# Patient Record
Sex: FEMALE | Race: WHITE | NOT HISPANIC OR LATINO | Employment: OTHER | URBAN - NONMETROPOLITAN AREA
[De-identification: names, ages, dates, MRNs, and addresses within clinical notes are randomized per-mention and may not be internally consistent; named-entity substitution may affect disease eponyms.]

---

## 2023-04-24 ENCOUNTER — TELEPHONE (OUTPATIENT)
Dept: PRIMARY CARE | Facility: CLINIC | Age: 68
End: 2023-04-24

## 2023-04-24 NOTE — TELEPHONE ENCOUNTER
Pt called stating that tomorrow she is have 2 MRI's back to back. She is very anxious and is requesting that you a RX for 1 percocet she said you've done this is the past for her. Please advise, Thank you.

## 2023-05-31 ENCOUNTER — HOSPITAL ENCOUNTER (OUTPATIENT)
Dept: HOSPITAL 100 - PAT | Age: 68
Discharge: HOME | End: 2023-05-31
Payer: MEDICARE

## 2023-05-31 DIAGNOSIS — Z01.818: Primary | ICD-10-CM

## 2023-05-31 LAB
ANION GAP: 6 (ref 5–15)
BUN SERPL-MCNC: 20 MG/DL (ref 7–18)
BUN/CREAT RATIO: 21.1 RATIO (ref 10–20)
CALCIUM SERPL-MCNC: 9.5 MG/DL (ref 8.5–10.1)
CARBON DIOXIDE: 28 MMOL/L (ref 21–32)
CHLORIDE: 104 MMOL/L (ref 98–107)
DEPRECATED RDW RBC: 46.9 FL (ref 35.1–43.9)
ERYTHROCYTE [DISTWIDTH] IN BLOOD: 13.4 % (ref 11.6–14.6)
EST GLOM FILT RATE - AFR AMER: 76 ML/MIN (ref 60–?)
GLUCOSE: 96 MG/DL (ref 74–106)
HCT VFR BLD AUTO: 45.6 % (ref 37–47)
HEMOGLOBIN: 14.9 G/DL (ref 12–15)
HGB BLD-MCNC: 14.9 G/DL (ref 12–15)
IMMATURE GRANULOCYTES COUNT: 0.01 X10^3/UL (ref 0–0)
MAGNESIUM: 2.4 MG/DL (ref 1.6–2.6)
MCV RBC: 94.6 FL (ref 81–99)
MEAN CORP HGB CONC: 32.7 G/DL (ref 32–36)
MEAN PLATELET VOL.: 11.8 FL (ref 6.2–12)
NRBC FLAGGED BY ANALYZER: 0 % (ref 0–5)
PLATELET # BLD: 221 K/MM3 (ref 150–450)
PLATELET COUNT: 221 K/MM3 (ref 150–450)
POTASSIUM: 4 MMOL/L (ref 3.5–5.1)
RBC # BLD AUTO: 4.82 M/MM3 (ref 4.2–5.4)
RBC DISTRIBUTION WIDTH CV: 13.4 % (ref 11.6–14.6)
RBC DISTRIBUTION WIDTH SD: 46.9 FL (ref 35.1–43.9)
WBC # BLD AUTO: 6.1 K/MM3 (ref 4.4–11)
WHITE BLOOD COUNT: 6.1 K/MM3 (ref 4.4–11)

## 2023-05-31 PROCEDURE — 36415 COLL VENOUS BLD VENIPUNCTURE: CPT

## 2023-05-31 PROCEDURE — 83735 ASSAY OF MAGNESIUM: CPT

## 2023-05-31 PROCEDURE — 87081 CULTURE SCREEN ONLY: CPT

## 2023-05-31 PROCEDURE — 86803 HEPATITIS C AB TEST: CPT

## 2023-05-31 PROCEDURE — 86703 HIV-1/HIV-2 1 RESULT ANTBDY: CPT

## 2023-05-31 PROCEDURE — 80048 BASIC METABOLIC PNL TOTAL CA: CPT

## 2023-05-31 PROCEDURE — 85025 COMPLETE CBC W/AUTO DIFF WBC: CPT

## 2023-05-31 PROCEDURE — 86706 HEP B SURFACE ANTIBODY: CPT

## 2023-05-31 PROCEDURE — 84443 ASSAY THYROID STIM HORMONE: CPT

## 2023-05-31 PROCEDURE — 86708 HEPATITIS A ANTIBODY: CPT

## 2023-06-07 ENCOUNTER — TELEPHONE (OUTPATIENT)
Dept: PRIMARY CARE | Facility: CLINIC | Age: 68
End: 2023-06-07

## 2023-06-07 NOTE — TELEPHONE ENCOUNTER
Pt LVM stating that she had an order for a stress test but it has . She would like for you to order a new one so she can get that set up. Please advise, Thank you.

## 2023-06-07 NOTE — TELEPHONE ENCOUNTER
Do you know how long ago it was ordered?  I guess I am not understanding why it was not done at the time that I requested it.  Probably need to see her back for reevaluation if its been several months

## 2023-06-08 ENCOUNTER — TELEPHONE (OUTPATIENT)
Dept: PRIMARY CARE | Facility: CLINIC | Age: 68
End: 2023-06-08

## 2023-06-08 NOTE — TELEPHONE ENCOUNTER
Danuta was supposed to have neck surgery last Tuesday by Dr. Ortiz in [place]. They cancelled the surgery because she had an abnormal stress test in 2018.  They want her to have another stress test before rescheduling the surgery. Can you order this for her?

## 2023-06-08 NOTE — TELEPHONE ENCOUNTER
She basically is in need of preoperative assessment and we certainly can order a stress test but we also need to see her for the complete preoperative assessment.  I will definitely need to see her for assessment.  Thank you

## 2023-06-14 ENCOUNTER — APPOINTMENT (OUTPATIENT)
Dept: PRIMARY CARE | Facility: CLINIC | Age: 68
End: 2023-06-14

## 2023-08-05 ENCOUNTER — HOSPITAL ENCOUNTER (OUTPATIENT)
Dept: HOSPITAL 100 - MRI | Age: 68
Discharge: HOME | End: 2023-08-05
Payer: MEDICARE

## 2023-08-05 DIAGNOSIS — M43.12: Primary | ICD-10-CM

## 2023-08-05 PROCEDURE — 72141 MRI NECK SPINE W/O DYE: CPT

## 2023-08-09 ENCOUNTER — HOSPITAL ENCOUNTER (INPATIENT)
Dept: HOSPITAL 100 - ACINP | Age: 68
LOS: 1 days | Discharge: HOME | DRG: 473 | End: 2023-08-10
Payer: MEDICARE

## 2023-08-09 VITALS
OXYGEN SATURATION: 94 % | RESPIRATION RATE: 18 BRPM | DIASTOLIC BLOOD PRESSURE: 82 MMHG | SYSTOLIC BLOOD PRESSURE: 130 MMHG | HEART RATE: 114 BPM | TEMPERATURE: 97.6 F

## 2023-08-09 VITALS
SYSTOLIC BLOOD PRESSURE: 118 MMHG | TEMPERATURE: 98.42 F | DIASTOLIC BLOOD PRESSURE: 80 MMHG | RESPIRATION RATE: 16 BRPM | OXYGEN SATURATION: 93 % | HEART RATE: 102 BPM

## 2023-08-09 VITALS
OXYGEN SATURATION: 94 % | DIASTOLIC BLOOD PRESSURE: 96 MMHG | SYSTOLIC BLOOD PRESSURE: 108 MMHG | HEART RATE: 115 BPM | RESPIRATION RATE: 16 BRPM

## 2023-08-09 VITALS
DIASTOLIC BLOOD PRESSURE: 96 MMHG | RESPIRATION RATE: 16 BRPM | HEART RATE: 111 BPM | SYSTOLIC BLOOD PRESSURE: 131 MMHG | OXYGEN SATURATION: 92 %

## 2023-08-09 VITALS
OXYGEN SATURATION: 93 % | SYSTOLIC BLOOD PRESSURE: 107 MMHG | DIASTOLIC BLOOD PRESSURE: 74 MMHG | RESPIRATION RATE: 18 BRPM | HEART RATE: 112 BPM | TEMPERATURE: 98.42 F

## 2023-08-09 VITALS
SYSTOLIC BLOOD PRESSURE: 141 MMHG | RESPIRATION RATE: 16 BRPM | DIASTOLIC BLOOD PRESSURE: 96 MMHG | OXYGEN SATURATION: 91 % | TEMPERATURE: 98.8 F | HEART RATE: 108 BPM

## 2023-08-09 VITALS
RESPIRATION RATE: 18 BRPM | OXYGEN SATURATION: 97 % | SYSTOLIC BLOOD PRESSURE: 131 MMHG | DIASTOLIC BLOOD PRESSURE: 96 MMHG | TEMPERATURE: 98.3 F | HEART RATE: 91 BPM

## 2023-08-09 VITALS
SYSTOLIC BLOOD PRESSURE: 131 MMHG | HEART RATE: 113 BPM | DIASTOLIC BLOOD PRESSURE: 96 MMHG | OXYGEN SATURATION: 92 % | RESPIRATION RATE: 16 BRPM

## 2023-08-09 VITALS
SYSTOLIC BLOOD PRESSURE: 136 MMHG | DIASTOLIC BLOOD PRESSURE: 96 MMHG | OXYGEN SATURATION: 99 % | RESPIRATION RATE: 15 BRPM | HEART RATE: 105 BPM

## 2023-08-09 VITALS — OXYGEN SATURATION: 93 %

## 2023-08-09 VITALS
RESPIRATION RATE: 15 BRPM | DIASTOLIC BLOOD PRESSURE: 90 MMHG | HEART RATE: 103 BPM | OXYGEN SATURATION: 94 % | SYSTOLIC BLOOD PRESSURE: 131 MMHG

## 2023-08-09 VITALS — HEART RATE: 102 BPM

## 2023-08-09 VITALS
RESPIRATION RATE: 16 BRPM | DIASTOLIC BLOOD PRESSURE: 96 MMHG | OXYGEN SATURATION: 95 % | SYSTOLIC BLOOD PRESSURE: 131 MMHG | TEMPERATURE: 97.34 F | HEART RATE: 113 BPM

## 2023-08-09 VITALS
RESPIRATION RATE: 16 BRPM | OXYGEN SATURATION: 92 % | SYSTOLIC BLOOD PRESSURE: 131 MMHG | DIASTOLIC BLOOD PRESSURE: 99 MMHG | HEART RATE: 112 BPM

## 2023-08-09 VITALS
SYSTOLIC BLOOD PRESSURE: 108 MMHG | HEART RATE: 118 BPM | OXYGEN SATURATION: 91 % | DIASTOLIC BLOOD PRESSURE: 83 MMHG | RESPIRATION RATE: 16 BRPM

## 2023-08-09 VITALS
SYSTOLIC BLOOD PRESSURE: 115 MMHG | HEART RATE: 90 BPM | TEMPERATURE: 98 F | RESPIRATION RATE: 16 BRPM | OXYGEN SATURATION: 92 % | DIASTOLIC BLOOD PRESSURE: 68 MMHG

## 2023-08-09 VITALS
SYSTOLIC BLOOD PRESSURE: 114 MMHG | HEART RATE: 108 BPM | DIASTOLIC BLOOD PRESSURE: 86 MMHG | RESPIRATION RATE: 14 BRPM | OXYGEN SATURATION: 95 %

## 2023-08-09 VITALS
RESPIRATION RATE: 16 BRPM | SYSTOLIC BLOOD PRESSURE: 131 MMHG | OXYGEN SATURATION: 99 % | DIASTOLIC BLOOD PRESSURE: 80 MMHG | HEART RATE: 107 BPM

## 2023-08-09 VITALS
OXYGEN SATURATION: 94 % | SYSTOLIC BLOOD PRESSURE: 131 MMHG | RESPIRATION RATE: 14 BRPM | HEART RATE: 115 BPM | DIASTOLIC BLOOD PRESSURE: 96 MMHG

## 2023-08-09 VITALS
DIASTOLIC BLOOD PRESSURE: 83 MMHG | RESPIRATION RATE: 14 BRPM | HEART RATE: 108 BPM | OXYGEN SATURATION: 93 % | SYSTOLIC BLOOD PRESSURE: 115 MMHG

## 2023-08-09 VITALS
HEART RATE: 116 BPM | OXYGEN SATURATION: 92 % | SYSTOLIC BLOOD PRESSURE: 131 MMHG | DIASTOLIC BLOOD PRESSURE: 96 MMHG | RESPIRATION RATE: 14 BRPM

## 2023-08-09 VITALS
TEMPERATURE: 98.1 F | OXYGEN SATURATION: 92 % | RESPIRATION RATE: 16 BRPM | SYSTOLIC BLOOD PRESSURE: 100 MMHG | DIASTOLIC BLOOD PRESSURE: 65 MMHG | HEART RATE: 102 BPM

## 2023-08-09 VITALS
RESPIRATION RATE: 14 BRPM | OXYGEN SATURATION: 93 % | DIASTOLIC BLOOD PRESSURE: 96 MMHG | SYSTOLIC BLOOD PRESSURE: 122 MMHG | HEART RATE: 110 BPM

## 2023-08-09 VITALS — SYSTOLIC BLOOD PRESSURE: 131 MMHG | DIASTOLIC BLOOD PRESSURE: 96 MMHG

## 2023-08-09 VITALS — BODY MASS INDEX: 27.3 KG/M2

## 2023-08-09 DIAGNOSIS — E78.00: ICD-10-CM

## 2023-08-09 DIAGNOSIS — Z79.82: ICD-10-CM

## 2023-08-09 DIAGNOSIS — M43.12: ICD-10-CM

## 2023-08-09 DIAGNOSIS — Z79.899: ICD-10-CM

## 2023-08-09 DIAGNOSIS — F41.9: ICD-10-CM

## 2023-08-09 DIAGNOSIS — F32.A: ICD-10-CM

## 2023-08-09 DIAGNOSIS — Z79.890: ICD-10-CM

## 2023-08-09 DIAGNOSIS — M50.123: Primary | ICD-10-CM

## 2023-08-09 DIAGNOSIS — I10: ICD-10-CM

## 2023-08-09 DIAGNOSIS — G43.709: ICD-10-CM

## 2023-08-09 DIAGNOSIS — G47.33: ICD-10-CM

## 2023-08-09 DIAGNOSIS — R09.02: ICD-10-CM

## 2023-08-09 DIAGNOSIS — K21.9: ICD-10-CM

## 2023-08-09 DIAGNOSIS — Z87.891: ICD-10-CM

## 2023-08-09 DIAGNOSIS — M48.02: ICD-10-CM

## 2023-08-09 DIAGNOSIS — E03.9: ICD-10-CM

## 2023-08-09 LAB — TROPONIN-I HS: 6 PG/ML (ref 3–54)

## 2023-08-09 PROCEDURE — 99252 IP/OBS CONSLTJ NEW/EST SF 35: CPT

## 2023-08-09 PROCEDURE — 93005 ELECTROCARDIOGRAM TRACING: CPT

## 2023-08-09 PROCEDURE — 82962 GLUCOSE BLOOD TEST: CPT

## 2023-08-09 PROCEDURE — 71045 X-RAY EXAM CHEST 1 VIEW: CPT

## 2023-08-09 PROCEDURE — 84484 ASSAY OF TROPONIN QUANT: CPT

## 2023-08-09 PROCEDURE — A4216 STERILE WATER/SALINE, 10 ML: HCPCS

## 2023-08-09 PROCEDURE — 97162 PT EVAL MOD COMPLEX 30 MIN: CPT

## 2023-08-09 PROCEDURE — C1713 ANCHOR/SCREW BN/BN,TIS/BN: HCPCS

## 2023-08-09 PROCEDURE — 94762 N-INVAS EAR/PLS OXIMTRY CONT: CPT

## 2023-08-09 PROCEDURE — 88304 TISSUE EXAM BY PATHOLOGIST: CPT

## 2023-08-09 PROCEDURE — 72020 X-RAY EXAM OF SPINE 1 VIEW: CPT

## 2023-08-09 PROCEDURE — G0463 HOSPITAL OUTPT CLINIC VISIT: HCPCS

## 2023-08-09 PROCEDURE — 94668 MNPJ CHEST WALL SBSQ: CPT

## 2023-08-09 PROCEDURE — 0RG20A0 FUSION OF 2 OR MORE CERVICAL VERTEBRAL JOINTS WITH INTERBODY FUSION DEVICE, ANTERIOR APPROACH, ANTERIOR COLUMN, OPEN APPROACH: ICD-10-PCS | Performed by: ORTHOPAEDIC SURGERY

## 2023-08-09 PROCEDURE — 94640 AIRWAY INHALATION TREATMENT: CPT

## 2023-08-09 RX ADMIN — CEFAZOLIN SODIUM 100 GM: 1 INJECTION, SOLUTION INTRAVENOUS at 15:47

## 2023-08-09 RX ADMIN — SODIUM CHLORIDE, PRESERVATIVE FREE 0 ML: 5 INJECTION INTRAVENOUS at 18:04

## 2023-08-09 RX ADMIN — CEFAZOLIN 150 GM: 10 INJECTION, POWDER, FOR SOLUTION INTRAVENOUS at 08:10

## 2023-08-09 RX ADMIN — MAGNESIUM SULFATE HEPTAHYDRATE 408 GM: 500 INJECTION, SOLUTION INTRAMUSCULAR; INTRAVENOUS at 06:10

## 2023-08-09 RX ADMIN — THROMBIN TOPICAL RECOMBINANT 20000 UNIT: KIT at 08:35

## 2023-08-09 RX ADMIN — CEFAZOLIN SODIUM 100 GM: 1 INJECTION, SOLUTION INTRAVENOUS at 23:46

## 2023-08-10 VITALS
RESPIRATION RATE: 18 BRPM | OXYGEN SATURATION: 92 % | TEMPERATURE: 97.52 F | HEART RATE: 89 BPM | DIASTOLIC BLOOD PRESSURE: 61 MMHG | SYSTOLIC BLOOD PRESSURE: 108 MMHG

## 2023-08-10 VITALS
HEART RATE: 78 BPM | SYSTOLIC BLOOD PRESSURE: 112 MMHG | OXYGEN SATURATION: 93 % | RESPIRATION RATE: 16 BRPM | TEMPERATURE: 97.3 F | DIASTOLIC BLOOD PRESSURE: 75 MMHG

## 2023-08-10 VITALS — OXYGEN SATURATION: 96 %

## 2023-08-10 VITALS
RESPIRATION RATE: 18 BRPM | DIASTOLIC BLOOD PRESSURE: 64 MMHG | TEMPERATURE: 97.5 F | SYSTOLIC BLOOD PRESSURE: 103 MMHG | OXYGEN SATURATION: 92 % | HEART RATE: 98 BPM

## 2023-08-10 VITALS
TEMPERATURE: 97.88 F | RESPIRATION RATE: 18 BRPM | SYSTOLIC BLOOD PRESSURE: 117 MMHG | HEART RATE: 93 BPM | DIASTOLIC BLOOD PRESSURE: 76 MMHG | OXYGEN SATURATION: 92 %

## 2023-08-10 VITALS — HEART RATE: 89 BPM | RESPIRATION RATE: 18 BRPM

## 2023-08-10 VITALS — OXYGEN SATURATION: 93 %

## 2023-08-10 VITALS — OXYGEN SATURATION: 95 %

## 2023-08-10 RX ADMIN — SODIUM CHLORIDE, PRESERVATIVE FREE 0 ML: 5 INJECTION INTRAVENOUS at 13:45

## 2023-08-10 RX ADMIN — Medication 1 LOZENGE: at 13:49

## 2023-08-29 ENCOUNTER — HOSPITAL ENCOUNTER (OUTPATIENT)
Dept: HOSPITAL 100 - OPBD | Age: 68
Discharge: HOME | End: 2023-08-29
Payer: MEDICARE

## 2023-08-29 DIAGNOSIS — M81.0: Primary | ICD-10-CM

## 2023-08-29 PROCEDURE — 77080 DXA BONE DENSITY AXIAL: CPT

## 2023-10-04 ENCOUNTER — OFFICE VISIT (OUTPATIENT)
Dept: PRIMARY CARE | Facility: CLINIC | Age: 68
End: 2023-10-04
Payer: MEDICARE

## 2023-10-04 VITALS
SYSTOLIC BLOOD PRESSURE: 120 MMHG | HEART RATE: 97 BPM | BODY MASS INDEX: 25.01 KG/M2 | OXYGEN SATURATION: 98 % | HEIGHT: 66 IN | DIASTOLIC BLOOD PRESSURE: 82 MMHG | WEIGHT: 155.6 LBS

## 2023-10-04 DIAGNOSIS — E78.2 MIXED HYPERLIPIDEMIA: ICD-10-CM

## 2023-10-04 DIAGNOSIS — R09.82 PND (POST-NASAL DRIP): ICD-10-CM

## 2023-10-04 DIAGNOSIS — Z12.31 ENCOUNTER FOR SCREENING MAMMOGRAM FOR MALIGNANT NEOPLASM OF BREAST: ICD-10-CM

## 2023-10-04 DIAGNOSIS — F41.8 ANXIETY WITH DEPRESSION: ICD-10-CM

## 2023-10-04 DIAGNOSIS — I15.9 SECONDARY HYPERTENSION: ICD-10-CM

## 2023-10-04 DIAGNOSIS — G47.00 INSOMNIA, UNSPECIFIED TYPE: ICD-10-CM

## 2023-10-04 DIAGNOSIS — Z00.00 MEDICARE ANNUAL WELLNESS VISIT, SUBSEQUENT: Primary | ICD-10-CM

## 2023-10-04 PROBLEM — L71.9 ROSACEA: Status: ACTIVE | Noted: 2023-10-04

## 2023-10-04 PROBLEM — K76.0 FATTY LIVER: Status: ACTIVE | Noted: 2023-10-04

## 2023-10-04 PROBLEM — J45.20 MILD INTERMITTENT ASTHMA WITHOUT COMPLICATION (HHS-HCC): Status: ACTIVE | Noted: 2023-10-04

## 2023-10-04 PROBLEM — M81.0 OSTEOPOROSIS: Status: ACTIVE | Noted: 2023-09-26

## 2023-10-04 PROBLEM — T78.40XA ALLERGIES: Status: ACTIVE | Noted: 2023-10-04

## 2023-10-04 PROBLEM — E55.9 VITAMIN D DEFICIENCY: Status: ACTIVE | Noted: 2023-10-04

## 2023-10-04 PROBLEM — G43.909 MIGRAINE HEADACHE: Status: ACTIVE | Noted: 2023-10-04

## 2023-10-04 PROBLEM — E03.8 SECONDARY HYPOTHYROIDISM: Status: ACTIVE | Noted: 2023-10-04

## 2023-10-04 PROBLEM — R69 TAKING MEDICATION FOR CHRONIC DISEASE: Status: ACTIVE | Noted: 2023-10-04

## 2023-10-04 PROBLEM — G47.30 SLEEP APNEA: Status: ACTIVE | Noted: 2019-05-20

## 2023-10-04 PROBLEM — E78.00 ELEVATED LDL CHOLESTEROL LEVEL: Status: ACTIVE | Noted: 2023-10-04

## 2023-10-04 PROCEDURE — 90662 IIV NO PRSV INCREASED AG IM: CPT | Performed by: INTERNAL MEDICINE

## 2023-10-04 PROCEDURE — 99214 OFFICE O/P EST MOD 30 MIN: CPT | Performed by: INTERNAL MEDICINE

## 2023-10-04 PROCEDURE — G0008 ADMIN INFLUENZA VIRUS VAC: HCPCS | Performed by: INTERNAL MEDICINE

## 2023-10-04 PROCEDURE — 3079F DIAST BP 80-89 MM HG: CPT | Performed by: INTERNAL MEDICINE

## 2023-10-04 PROCEDURE — 1159F MED LIST DOCD IN RCRD: CPT | Performed by: INTERNAL MEDICINE

## 2023-10-04 PROCEDURE — 1036F TOBACCO NON-USER: CPT | Performed by: INTERNAL MEDICINE

## 2023-10-04 PROCEDURE — 90677 PCV20 VACCINE IM: CPT | Performed by: INTERNAL MEDICINE

## 2023-10-04 PROCEDURE — 1160F RVW MEDS BY RX/DR IN RCRD: CPT | Performed by: INTERNAL MEDICINE

## 2023-10-04 PROCEDURE — 3074F SYST BP LT 130 MM HG: CPT | Performed by: INTERNAL MEDICINE

## 2023-10-04 PROCEDURE — G0439 PPPS, SUBSEQ VISIT: HCPCS | Performed by: INTERNAL MEDICINE

## 2023-10-04 PROCEDURE — 1170F FXNL STATUS ASSESSED: CPT | Performed by: INTERNAL MEDICINE

## 2023-10-04 PROCEDURE — G0009 ADMIN PNEUMOCOCCAL VACCINE: HCPCS | Performed by: INTERNAL MEDICINE

## 2023-10-04 RX ORDER — TRAZODONE HYDROCHLORIDE 50 MG/1
50 TABLET ORAL NIGHTLY
Qty: 90 TABLET | Refills: 1 | Status: SHIPPED | OUTPATIENT
Start: 2023-10-04 | End: 2024-01-04 | Stop reason: WASHOUT

## 2023-10-04 RX ORDER — TRAZODONE HYDROCHLORIDE 50 MG/1
50 TABLET ORAL NIGHTLY
Qty: 10 TABLET | Refills: 0 | Status: SHIPPED | OUTPATIENT
Start: 2023-10-04 | End: 2024-01-04

## 2023-10-04 RX ORDER — VENLAFAXINE HYDROCHLORIDE 150 MG/1
150 CAPSULE, EXTENDED RELEASE ORAL DAILY
Qty: 90 CAPSULE | Refills: 1 | Status: SHIPPED | OUTPATIENT
Start: 2023-10-04 | End: 2024-02-12

## 2023-10-04 RX ORDER — LISINOPRIL AND HYDROCHLOROTHIAZIDE 10; 12.5 MG/1; MG/1
1 TABLET ORAL DAILY
COMMUNITY
Start: 2020-04-01 | End: 2023-10-04 | Stop reason: SDUPTHER

## 2023-10-04 RX ORDER — VENLAFAXINE HYDROCHLORIDE 150 MG/1
150 CAPSULE, EXTENDED RELEASE ORAL DAILY
COMMUNITY
Start: 2021-05-10 | End: 2023-10-04 | Stop reason: SDUPTHER

## 2023-10-04 RX ORDER — ASPIRIN 81 MG/1
81 TABLET ORAL DAILY
COMMUNITY

## 2023-10-04 RX ORDER — ATORVASTATIN CALCIUM 40 MG/1
40 TABLET, FILM COATED ORAL DAILY
COMMUNITY
Start: 2018-05-09 | End: 2023-12-27

## 2023-10-04 RX ORDER — TRAZODONE HYDROCHLORIDE 50 MG/1
50 TABLET ORAL NIGHTLY
COMMUNITY
End: 2023-10-04 | Stop reason: SDUPTHER

## 2023-10-04 RX ORDER — METOPROLOL SUCCINATE 25 MG/1
25 TABLET, EXTENDED RELEASE ORAL DAILY
COMMUNITY
Start: 2020-01-29 | End: 2023-10-04 | Stop reason: SDUPTHER

## 2023-10-04 RX ORDER — METOPROLOL SUCCINATE 25 MG/1
25 TABLET, EXTENDED RELEASE ORAL DAILY
Qty: 90 TABLET | Refills: 1 | Status: SHIPPED | OUTPATIENT
Start: 2023-10-04 | End: 2024-02-12

## 2023-10-04 RX ORDER — LEVOTHYROXINE SODIUM 88 UG/1
88 TABLET ORAL
COMMUNITY
Start: 2023-07-20 | End: 2024-04-03 | Stop reason: SDUPTHER

## 2023-10-04 RX ORDER — LISINOPRIL AND HYDROCHLOROTHIAZIDE 10; 12.5 MG/1; MG/1
1 TABLET ORAL DAILY
Qty: 90 TABLET | Refills: 1 | Status: SHIPPED | OUTPATIENT
Start: 2023-10-04 | End: 2024-02-12

## 2023-10-04 RX ORDER — FLUTICASONE PROPIONATE 50 MCG
1 SPRAY, SUSPENSION (ML) NASAL DAILY
Qty: 16 G | Refills: 5 | Status: SHIPPED | OUTPATIENT
Start: 2023-10-04 | End: 2024-10-03

## 2023-10-04 ASSESSMENT — PATIENT HEALTH QUESTIONNAIRE - PHQ9
SUM OF ALL RESPONSES TO PHQ9 QUESTIONS 1 AND 2: 0
1. LITTLE INTEREST OR PLEASURE IN DOING THINGS: NOT AT ALL
2. FEELING DOWN, DEPRESSED OR HOPELESS: NOT AT ALL
1. LITTLE INTEREST OR PLEASURE IN DOING THINGS: NOT AT ALL
SUM OF ALL RESPONSES TO PHQ9 QUESTIONS 1 AND 2: 0
2. FEELING DOWN, DEPRESSED OR HOPELESS: NOT AT ALL

## 2023-10-04 ASSESSMENT — ACTIVITIES OF DAILY LIVING (ADL)
BATHING: INDEPENDENT
DOING_HOUSEWORK: INDEPENDENT
MANAGING_FINANCES: INDEPENDENT
TAKING_MEDICATION: INDEPENDENT
DRESSING: INDEPENDENT
GROCERY_SHOPPING: INDEPENDENT

## 2023-10-04 NOTE — PATIENT INSTRUCTIONS
As we discussed I would like for you to start using Flonase nasal spray about an hour prior to bedtime to see if this will help with your postnasal drip and congestion.  I sent a prescription to your pharmacy in case the Flonase you have now is not good.  Please let us know how you are doing  Please try to remember to bring a copy of your advance directives which includes living will and power of  for healthcare the next time you come in  The staff will be helping you to schedule your screening mammogram  We are giving you a pneumonia vaccine today called Prevnar 20 which is recommended and would be a one-time vaccine  Please remember to put grab bars in your bathroom and try to get 30 minutes of aerobic activity 5 days a week

## 2023-10-04 NOTE — PROGRESS NOTES
Subjective   Reason for Visit: Caron Marc is an 67 y.o. female here for a Medicare Wellness visit.     Past Medical, Surgical, and Family History reviewed and updated in chart.    Reviewed all medications by prescribing practitioner or clinical pharmacist (such as prescriptions, OTCs, herbal therapies and supplements) and documented in the medical record.    HPI  She is here today for a routine 6-month checkup and we also completed her annual Medicare wellness visit.  She is looking great and for the most part reports feeling well.  She states since discovered that the source of her headaches was actually neck issues and she had neck surgery on August 9.  She states her headaches have more or less disappeared and she is on less medication at this point.  She also has been diagnosed with osteoporosis and received IV Nica last as part of her treatment.  She is following with Dr. Cancino in [place] for her thyroid condition and hyperparathyroidism.  She had recent laboratory testing in September and I have reviewed that today.  We did conduct a full review of systems and went through the Medicare questionnaire.  She has had no falls in the last year.  We discussed fall prevention and I have specifically recommended she put grab bars in her bathroom.  We talked about avoiding unnecessary rugs.  Her memory appears to be good.  Her hearing also appears to be good.  She reports that her diet is well-balanced and although she is active she does not really exercise on a regular basis.  I do remind her to try to get 30 minutes of aerobic activity 5 days a week.  We also discussed providing a copy of her advance directives for her chart here.  Her blood pressure was excellent today.  She is due for screening mammogram and we will get that ordered.  We are also going to give her the pneumonia vaccine Prevnar 20 today.  If everything goes according to plan and we will see her back in 6 months for reevaluation  Patient Care  "Team:  Eleanor Downs DO as PCP - General  Eleanor Downs DO as PCP - [employer] PCP         Objective   Vitals:  /82   Pulse 97   Ht 1.676 m (5' 5.98\")   Wt 70.6 kg (155 lb 9.6 oz)   SpO2 98%   BMI 25.13 kg/m²       Physical Exam  Vitals and nursing note reviewed.   Constitutional:       General: She is not in acute distress.     Appearance: Normal appearance.   HENT:      Head: Normocephalic and atraumatic.   Eyes:      Conjunctiva/sclera: Conjunctivae normal.   Cardiovascular:      Rate and Rhythm: Normal rate and regular rhythm.      Heart sounds: Normal heart sounds.   Pulmonary:      Effort: No respiratory distress.      Breath sounds: No wheezing.   Abdominal:      Palpations: Abdomen is soft.      Tenderness: There is no abdominal tenderness. There is no guarding.   Musculoskeletal:         General: No swelling. Normal range of motion.   Skin:     General: Skin is warm and dry.   Neurological:      General: No focal deficit present.      Mental Status: She is alert and oriented to person, place, and time.   Psychiatric:         Behavior: Behavior normal.       Assessment/Plan   Problem List Items Addressed This Visit       Encounter for screening mammogram for malignant neoplasm of breast - Primary    PND (post-nasal drip)     -She does have some drainage when lying flat at night and I believe she is suffering from postnasal drip  -She will try Flonase nasal spray 2 sprays each nostril daily and she will let us know if her symptoms do not resolve         Relevant Medications    fluticasone (Flonase) 50 mcg/actuation nasal spray    Anxiety with depression     -Doing well at this time and will continue with Effexor Exar 150 mg daily         Relevant Medications    venlafaxine XR (Effexor-XR) 150 mg 24 hr capsule    Hypertension     -Currently well controlled  -She will remain on metoprolol succinate XL 25 mg daily  -Lisinopril hydrochlorothiazide 10-12.51 tablet daily         " Relevant Medications    lisinopriL-hydrochlorothiazide 10-12.5 mg tablet    metoprolol succinate XL (Toprol-XL) 25 mg 24 hr tablet    Mixed hyperlipidemia     -She will get a fasting lipid profile done prior to her next follow-up visit  -She will continue with atorvastatin 40 mg daily         Relevant Orders    Lipid Panel    Insomnia    Relevant Medications    traZODone (Desyrel) 50 mg tablet    traZODone (Desyrel) 50 mg tablet

## 2023-10-04 NOTE — ASSESSMENT & PLAN NOTE
-Currently well controlled  -She will remain on metoprolol succinate XL 25 mg daily  -Lisinopril hydrochlorothiazide 10-12.51 tablet daily

## 2023-10-04 NOTE — ASSESSMENT & PLAN NOTE
-She will get a fasting lipid profile done prior to her next follow-up visit  -She will continue with atorvastatin 40 mg daily

## 2023-10-04 NOTE — ASSESSMENT & PLAN NOTE
-She does have some drainage when lying flat at night and I believe she is suffering from postnasal drip  -She will try Flonase nasal spray 2 sprays each nostril daily and she will let us know if her symptoms do not resolve

## 2023-12-23 DIAGNOSIS — E78.00 ELEVATED LDL CHOLESTEROL LEVEL: ICD-10-CM

## 2023-12-27 RX ORDER — ATORVASTATIN CALCIUM 40 MG/1
40 TABLET, FILM COATED ORAL DAILY
Qty: 90 TABLET | Refills: 1 | Status: SHIPPED | OUTPATIENT
Start: 2023-12-27 | End: 2024-04-03 | Stop reason: SDUPTHER

## 2024-01-04 ENCOUNTER — OFFICE VISIT (OUTPATIENT)
Dept: PRIMARY CARE | Facility: CLINIC | Age: 69
End: 2024-01-04
Payer: MEDICARE

## 2024-01-04 VITALS
HEART RATE: 98 BPM | BODY MASS INDEX: 25.53 KG/M2 | WEIGHT: 158.1 LBS | OXYGEN SATURATION: 94 % | DIASTOLIC BLOOD PRESSURE: 80 MMHG | SYSTOLIC BLOOD PRESSURE: 118 MMHG

## 2024-01-04 DIAGNOSIS — G47.09 OTHER INSOMNIA: ICD-10-CM

## 2024-01-04 DIAGNOSIS — Z12.31 ENCOUNTER FOR SCREENING MAMMOGRAM FOR MALIGNANT NEOPLASM OF BREAST: ICD-10-CM

## 2024-01-04 DIAGNOSIS — R09.82 PND (POST-NASAL DRIP): ICD-10-CM

## 2024-01-04 DIAGNOSIS — H61.91 DISORDER OF RIGHT EAR LOBE: Primary | ICD-10-CM

## 2024-01-04 PROCEDURE — 99214 OFFICE O/P EST MOD 30 MIN: CPT | Performed by: INTERNAL MEDICINE

## 2024-01-04 PROCEDURE — 3074F SYST BP LT 130 MM HG: CPT | Performed by: INTERNAL MEDICINE

## 2024-01-04 PROCEDURE — 1036F TOBACCO NON-USER: CPT | Performed by: INTERNAL MEDICINE

## 2024-01-04 PROCEDURE — 1159F MED LIST DOCD IN RCRD: CPT | Performed by: INTERNAL MEDICINE

## 2024-01-04 PROCEDURE — 3079F DIAST BP 80-89 MM HG: CPT | Performed by: INTERNAL MEDICINE

## 2024-01-04 PROCEDURE — 1160F RVW MEDS BY RX/DR IN RCRD: CPT | Performed by: INTERNAL MEDICINE

## 2024-01-04 RX ORDER — TRAZODONE HYDROCHLORIDE 100 MG/1
100 TABLET ORAL NIGHTLY PRN
Qty: 30 TABLET | Refills: 5 | Status: SHIPPED | OUTPATIENT
Start: 2024-01-04 | End: 2025-01-03

## 2024-01-04 ASSESSMENT — ENCOUNTER SYMPTOMS
VOMITING: 0
FEVER: 0
COUGH: 0
BACK PAIN: 0
DIARRHEA: 0
FATIGUE: 0
SHORTNESS OF BREATH: 0
PALPITATIONS: 0
SINUS PAIN: 1
SINUS COMPLAINT: 1
ARTHRALGIAS: 0
SINUS PRESSURE: 1
SORE THROAT: 1
NAUSEA: 0
RHINORRHEA: 0
WHEEZING: 0

## 2024-01-04 ASSESSMENT — PATIENT HEALTH QUESTIONNAIRE - PHQ9
1. LITTLE INTEREST OR PLEASURE IN DOING THINGS: NOT AT ALL
SUM OF ALL RESPONSES TO PHQ9 QUESTIONS 1 AND 2: 0
2. FEELING DOWN, DEPRESSED OR HOPELESS: NOT AT ALL

## 2024-01-04 NOTE — PROGRESS NOTES
Subjective   Patient ID: Caron Marc is a 68 y.o. female who presents for Ear Problem (Knot on earlobe-itches,burns. X 2 months) and Sinus Problem.  Sinus Problem  Associated symptoms include congestion, ear pain, sinus pressure and a sore throat. Pertinent negatives include no coughing or shortness of breath.   She is here today for evaluation of several concerns.  She has had prior ear piercings and on the right ear she started developing irritation at the piercing site so she stopped wearing her earrings.  Despite not using earrings she continues to have irritation to that right earlobe and she has a thickening at the site where the hole was.  It is tender to touch.  It has been present for several months.  She also struggles with postnasal drip despite using her Flonase nasal spray.  After further discussion I decided to refer her to ENT so that we can address both problems.  She also states she still struggles with insomnia and her trazodone at 50 mg was ineffective.  Again we talked about sleep hygiene and how sometimes altering sleep practices can make a huge difference and better sleep.  We also discussed how we try to get away from the hypnotics because it can have side effects over time.  I have agreed to give her a higher dose of trazodone but I am also giving her handout that goes over all the tips to help improve her sleep hygiene and I asked that she read the handout several times and follow all the directions.  Otherwise we will see her back as previously planned.  Review of Systems   Constitutional:  Negative for fatigue and fever.   HENT:  Positive for congestion, ear pain, postnasal drip, sinus pressure, sinus pain and sore throat. Negative for rhinorrhea.    Respiratory:  Negative for cough, shortness of breath and wheezing.    Cardiovascular:  Negative for chest pain, palpitations and leg swelling.   Gastrointestinal:  Negative for diarrhea, nausea and vomiting.   Musculoskeletal:  Negative  for arthralgias and back pain.     Objective   Physical Exam  Vitals and nursing note reviewed.   Constitutional:       General: She is not in acute distress.     Appearance: Normal appearance.   HENT:      Head: Normocephalic and atraumatic.   Eyes:      Conjunctiva/sclera: Conjunctivae normal.   Cardiovascular:      Rate and Rhythm: Normal rate and regular rhythm.      Heart sounds: Normal heart sounds.   Pulmonary:      Effort: No respiratory distress.      Breath sounds: No wheezing.   Abdominal:      Palpations: Abdomen is soft.      Tenderness: There is no abdominal tenderness. There is no guarding.   Musculoskeletal:         General: No swelling. Normal range of motion.   Skin:     General: Skin is warm and dry.   Neurological:      General: No focal deficit present.      Mental Status: She is alert and oriented to person, place, and time.   Psychiatric:         Behavior: Behavior normal.       Assessment/Plan   Problem List Items Addressed This Visit             ICD-10-CM    Encounter for screening mammogram for malignant neoplasm of breast Z12.31     -She has agreed to get her mammogram scheduled at her earliest convenience         Relevant Orders    BI mammo bilateral screening tomosynthesis    PND (post-nasal drip) R09.82     -She will continue with her Flonase nasal spray but also see Dr. 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 Richard for further evaluation and treatment         Relevant Orders    Referral to ENT          [facility]

## 2024-01-04 NOTE — ASSESSMENT & PLAN NOTE
-I have increased the dose of her trazodone to 100 mg at bedtime  -We will have her work on the sleep hygiene recommendations and hopefully she can get away from using the hypnotic regularly

## 2024-01-04 NOTE — ASSESSMENT & PLAN NOTE
-She will continue with her Flonase nasal spray but also see Dr. Richard for consideration of allergy testing

## 2024-01-04 NOTE — PATIENT INSTRUCTIONS
The staff will be helping you to get an appointment with Dr. Richard to evaluate your right earlobe and also to hopefully help with your allergy symptoms  Please remember to get your mammogram scheduled at your earliest convenience as we want to be caught up for breast cancer screening  Please read the handout I gave you today on insomnia several times as there are great tips in helping to improve your sleep habits and hopefully allow you to get away from the prescription medication  We will see you back as previously planned

## 2024-01-11 ENCOUNTER — ANCILLARY PROCEDURE (OUTPATIENT)
Dept: RADIOLOGY | Facility: CLINIC | Age: 69
End: 2024-01-11
Payer: MEDICARE

## 2024-01-11 DIAGNOSIS — Z12.31 ENCOUNTER FOR SCREENING MAMMOGRAM FOR MALIGNANT NEOPLASM OF BREAST: ICD-10-CM

## 2024-01-11 PROCEDURE — 77067 SCR MAMMO BI INCL CAD: CPT

## 2024-01-11 PROCEDURE — 77063 BREAST TOMOSYNTHESIS BI: CPT | Performed by: RADIOLOGY

## 2024-01-11 PROCEDURE — 77067 SCR MAMMO BI INCL CAD: CPT | Performed by: RADIOLOGY

## 2024-01-22 ENCOUNTER — OFFICE VISIT (OUTPATIENT)
Dept: URGENT CARE | Facility: CLINIC | Age: 69
End: 2024-01-22
Payer: MEDICARE

## 2024-01-22 VITALS
HEART RATE: 87 BPM | OXYGEN SATURATION: 96 % | DIASTOLIC BLOOD PRESSURE: 90 MMHG | TEMPERATURE: 97.1 F | HEIGHT: 63 IN | RESPIRATION RATE: 18 BRPM | BODY MASS INDEX: 26.58 KG/M2 | WEIGHT: 150 LBS | SYSTOLIC BLOOD PRESSURE: 128 MMHG

## 2024-01-22 DIAGNOSIS — J02.9 SORE THROAT: ICD-10-CM

## 2024-01-22 DIAGNOSIS — J01.00 ACUTE NON-RECURRENT MAXILLARY SINUSITIS: Primary | ICD-10-CM

## 2024-01-22 LAB — POC GROUP A STREP, PCR: NOT DETECTED

## 2024-01-22 PROCEDURE — 99213 OFFICE O/P EST LOW 20 MIN: CPT | Mod: 25 | Performed by: NURSE PRACTITIONER

## 2024-01-22 RX ORDER — AMOXICILLIN AND CLAVULANATE POTASSIUM 875; 125 MG/1; MG/1
1 TABLET, FILM COATED ORAL 2 TIMES DAILY
Qty: 14 TABLET | Refills: 0 | Status: SHIPPED | OUTPATIENT
Start: 2024-01-22 | End: 2024-01-29

## 2024-01-22 NOTE — PROGRESS NOTES
[facility] NOTE:      Name: Caron Marc, 68 y.o.    CSN:[id number]   MRN:[id number]    PCP: Eleanor Downs, DO    ALL:  No Known Allergies    History:    Chief Complaint: Sore Throat (Sore throat, sinus congestion , headaches, chills X 4 days)    Encounter Date: 1/22/2024  11:40    HPI: The history was obtained from the patient. Caron is a 68 y.o. female, who presents with a chief complaint of Sore Throat (Sore throat, sinus congestion , headaches, chills X 4 days) . States that the cough is dry, has also been having chills/sweats. Denies any N/V/D, chest pain, fever. Has tried hydrogen peroxide gargles and has had no relief of symptoms.     PMHx:    Past Medical History:   Diagnosis Date    Obstructive sleep apnea (adult) (pediatric)     Obstructive sleep apnea, adult    Other specified abnormal findings of blood chemistry 08/11/2021    Elevated liver function tests    Personal history of other diseases of the musculoskeletal system and connective tissue     History of osteoporosis    Personal history of other endocrine, nutritional and metabolic disease     History of hypothyroidism    Personal history of other endocrine, nutritional and metabolic disease     History of hyperlipidemia    Personal history of other specified conditions 07/30/2020    History of abdominal pain    Unspecified fracture of sternum, initial encounter for closed fracture 01/28/2021    Fracture of sternum              Current Outpatient Medications   Medication Sig Dispense Refill    aspirin (Aspir-81) 81 mg EC tablet Take 1 tablet (81 mg) by mouth once daily.      atorvastatin (Lipitor) 40 mg tablet TAKE 1 TABLET BY MOUTH DAILY 90 tablet 1    fluticasone (Flonase) 50 mcg/actuation nasal spray Administer 1 spray into each nostril once daily. Shake gently. Before first use, prime pump. After use, clean tip and replace cap. 16 g 5    levothyroxine (Synthroid, Levoxyl) 88 mcg tablet Take 1 tablet (88 mcg) by  mouth once daily.      lisinopriL-hydrochlorothiazide 10-12.5 mg tablet Take 1 tablet by mouth once daily. 90 tablet 1    metoprolol succinate XL (Toprol-XL) 25 mg 24 hr tablet Take 1 tablet (25 mg) by mouth once daily. 90 tablet 1    traZODone (Desyrel) 100 mg tablet Take 1 tablet (100 mg) by mouth as needed at bedtime for sleep. 30 tablet 5    venlafaxine XR (Effexor-XR) 150 mg 24 hr capsule Take 1 capsule (150 mg) by mouth once daily. 90 capsule 1    amoxicillin-pot clavulanate (Augmentin) 875-125 mg tablet Take 1 tablet (875 mg) by mouth 2 times a day for 7 days. 14 tablet 0     No current facility-administered medications for this visit.         PMSx:    Past Surgical History:   Procedure Laterality Date    CERVICAL DISC SURGERY      COLONOSCOPY  2020    Complete Colonoscopy    OTHER SURGICAL HISTORY  2021    Joint replacement procedure    OTHER SURGICAL HISTORY  2020    Wrist surgery       Fam Hx: No family history on file.    SOC. Hx:     Social History     Socioeconomic History    Marital status:      Spouse name: Not on file    Number of children: Not on file    Years of education: Not on file    Highest education level: Not on file   Occupational History    Not on file   Tobacco Use    Smoking status: Former     Types: Cigarettes     Quit date:      Years since quittin.0    Smokeless tobacco: Never   Substance and Sexual Activity    Alcohol use: Yes    Drug use: Not Currently    Sexual activity: Not on file   Other Topics Concern    Not on file   Social History Narrative    Not on file     Social Determinants of Health     Financial Resource Strain: Not on file   Food Insecurity: Not on file   Transportation Needs: Not on file   Physical Activity: Not on file   Stress: Not on file   Social Connections: Not on file   Intimate Partner Violence: Not on file   Housing Stability: Not on file         Vitals:    24 1114   BP: 128/90   Pulse: 87   Resp: 18   Temp: 36.2 °C  (97.1 °F)   SpO2: 96%     68 kg (150 lb)          Physical Exam  Constitutional:       Appearance: Normal appearance.   HENT:      Head: Normocephalic and atraumatic.      Right Ear: Tympanic membrane, ear canal and external ear normal.      Left Ear: Tympanic membrane, ear canal and external ear normal.      Nose: Nose normal.      Mouth/Throat:      Mouth: Mucous membranes are moist.      Pharynx: Posterior oropharyngeal erythema present. No oropharyngeal exudate.   Eyes:      Extraocular Movements: Extraocular movements intact.   Cardiovascular:      Rate and Rhythm: Normal rate and regular rhythm.      Pulses: Normal pulses.      Heart sounds: Normal heart sounds.   Pulmonary:      Effort: Pulmonary effort is normal.      Breath sounds: Normal breath sounds.   Musculoskeletal:         General: Normal range of motion.      Cervical back: Normal range of motion and neck supple.   Skin:     General: Skin is warm.   Neurological:      General: No focal deficit present.      Mental Status: She is alert and oriented to person, place, and time.   Psychiatric:         Mood and Affect: Mood normal.         Behavior: Behavior normal.           LABORATORY @ RADIOLOGICAL IMAGING (if done):     Recent Results (from the past 1 hour(s))   POCT Group A Streptococcus, PCR manually resulted    Collection Time: 01/22/24 12:12 PM   Result Value Ref Range    POC Group A Strep, PCR Not Detected Not Detected       UC COURSE/MEDICAL DECISION MAKING:    Caron is a 68 y.o., who presents with a working diagnosis of   1. Acute non-recurrent maxillary sinusitis    2. Sore throat     with a differential to include: Pneumonia, COVID, Flu, streptococcal pharyngitis.     Other Ddx less likely due to no fevers, shortness of breath, difficulty breathing, productive cough. Infectious Dx cannot be ruled out due to no testing in clinic per pt request. Also, no exudates or lymphadenopathy were seen on exam.    Patient is to be started on Augmentin  and educated to take full dose. Also, recommended patient to start doing salt gargles, hot liquids for sore throat relief. Warned patient to return if any worsening symptoms or development of chest pain/shortness of breath. Patient agreed to treatment plan and all concerns were addressed.       Ulises RAMOS  [facility]      Supervised by:   Jarrett Suarez CNP   Advanced Practice Provider  [facility]

## 2024-02-07 ENCOUNTER — HOSPITAL ENCOUNTER (OUTPATIENT)
Dept: RADIOLOGY | Facility: HOSPITAL | Age: 69
Discharge: HOME | End: 2024-02-07
Payer: MEDICARE

## 2024-02-07 DIAGNOSIS — J30.1 ALLERGIC RHINITIS DUE TO POLLEN: ICD-10-CM

## 2024-02-07 PROCEDURE — 70486 CT MAXILLOFACIAL W/O DYE: CPT

## 2024-02-07 PROCEDURE — 70486 CT MAXILLOFACIAL W/O DYE: CPT | Performed by: RADIOLOGY

## 2024-02-08 ENCOUNTER — OFFICE VISIT (OUTPATIENT)
Dept: PRIMARY CARE | Facility: CLINIC | Age: 69
End: 2024-02-08
Payer: MEDICARE

## 2024-02-08 VITALS
WEIGHT: 155 LBS | SYSTOLIC BLOOD PRESSURE: 108 MMHG | OXYGEN SATURATION: 98 % | HEIGHT: 63 IN | BODY MASS INDEX: 27.46 KG/M2 | HEART RATE: 86 BPM | DIASTOLIC BLOOD PRESSURE: 72 MMHG

## 2024-02-08 DIAGNOSIS — Z01.818 PREOPERATIVE CLEARANCE: Primary | ICD-10-CM

## 2024-02-08 PROCEDURE — 3078F DIAST BP <80 MM HG: CPT | Performed by: INTERNAL MEDICINE

## 2024-02-08 PROCEDURE — 1036F TOBACCO NON-USER: CPT | Performed by: INTERNAL MEDICINE

## 2024-02-08 PROCEDURE — 3074F SYST BP LT 130 MM HG: CPT | Performed by: INTERNAL MEDICINE

## 2024-02-08 PROCEDURE — 99213 OFFICE O/P EST LOW 20 MIN: CPT | Performed by: INTERNAL MEDICINE

## 2024-02-08 PROCEDURE — 1160F RVW MEDS BY RX/DR IN RCRD: CPT | Performed by: INTERNAL MEDICINE

## 2024-02-08 PROCEDURE — 1159F MED LIST DOCD IN RCRD: CPT | Performed by: INTERNAL MEDICINE

## 2024-02-08 ASSESSMENT — ENCOUNTER SYMPTOMS
ABDOMINAL PAIN: 0
CHEST TIGHTNESS: 0
ARTHRALGIAS: 0
PALPITATIONS: 0
BLOOD IN STOOL: 0
WHEEZING: 0
FATIGUE: 0
NAUSEA: 0
COUGH: 0
BACK PAIN: 0
SHORTNESS OF BREATH: 0
VOMITING: 0
DIARRHEA: 0

## 2024-02-08 ASSESSMENT — PATIENT HEALTH QUESTIONNAIRE - PHQ9
SUM OF ALL RESPONSES TO PHQ9 QUESTIONS 1 AND 2: 0
1. LITTLE INTEREST OR PLEASURE IN DOING THINGS: NOT AT ALL
2. FEELING DOWN, DEPRESSED OR HOPELESS: NOT AT ALL

## 2024-02-08 NOTE — PATIENT INSTRUCTIONS
As we discussed I am having to send you to the hospital for an EKG because our current EKG is malfunctioning  Once your EKG results return I will contact you regarding the results  If your EKG does not show any changes from the one you had in August then I will clear you for surgery.  You will also be getting a blood test called a BMP which contains a glucose of please try to go fasting  Please remember to follow your surgeon's directions regarding preoperative care.

## 2024-02-08 NOTE — PROGRESS NOTES
Subjective   Patient ID: Caron Marc is a 68 y.o. female who presents for Pre-op Exam.  HPI  ADDENDUM: Christiana's EKG came back showing no acute changes and lab work was within normal range.  I have cleared her for her cataract surgery.    She is here today for preoperative assessment.  She is scheduled to have bilateral cataract surgery under [facility] in March.  She has not had any preop testing to this point.  We are reminded however that she had neck surgery back in August 2023 and she did great.  She had no postoperative complications and no problems with anesthesia.  At that time she had an EKG which showed nonspecific findings.  We also conducted a review of systems and she has had no cardiopulmonary symptoms.  She states she walks her dog every day for about 10 to 15 minutes and she also climbs up and down stairs every day in her home.  At that point she has no symptoms.  Theoretically she does have some risk factors for heart disease and what that I am ordering an EKG as well as a BMP.  Once the results return I will be contacting her and I explained today that if there are no changes then I will clear her for this low risk surgery.  Review of Systems   Constitutional:  Negative for fatigue.   Respiratory:  Negative for cough, chest tightness, shortness of breath and wheezing.    Cardiovascular:  Negative for chest pain, palpitations and leg swelling.   Gastrointestinal:  Negative for abdominal pain, blood in stool, diarrhea, nausea and vomiting.   Musculoskeletal:  Negative for arthralgias and back pain.     Objective   Physical Exam  Vitals and nursing note reviewed.   Constitutional:       General: She is not in acute distress.     Appearance: Normal appearance.   HENT:      Head: Normocephalic and atraumatic.   Eyes:      Conjunctiva/sclera: Conjunctivae normal.   Cardiovascular:      Rate and Rhythm: Normal rate and regular rhythm.      Heart sounds: Normal heart sounds.   Pulmonary:      Effort: No  respiratory distress.      Breath sounds: No wheezing.   Abdominal:      Palpations: Abdomen is soft.      Tenderness: There is no abdominal tenderness. There is no guarding.   Musculoskeletal:         General: No swelling. Normal range of motion.   Skin:     General: Skin is warm and dry.   Neurological:      General: No focal deficit present.      Mental Status: She is alert and oriented to person, place, and time.   Psychiatric:         Behavior: Behavior normal.       Assessment/Plan   Problem List Items Addressed This Visit             ICD-10-CM    Preoperative clearance - Primary Z01.818     -She is scheduled to have bilateral cataract surgery under [facility] in March  -She has no cardiopulmonary symptoms  -She does walk the dog and climb the stairs in her home without difficulties  -She had a prior surgical challenge in August 2023 under general anesthetic and had no difficulties  -She did have an abnormal EKG in the past so I will be ordering an EKG for comparison and if there are no changes I will clear her for surgery  -I told her she would not need to return unless we have problems and I will addend her report today for her surgeon         Relevant Orders    Basic Metabolic Panel    ECG 12 lead (Ancillary Performed)          Eleanor Downs DO

## 2024-02-08 NOTE — ASSESSMENT & PLAN NOTE
-She is scheduled to have bilateral cataract surgery under [facility] in March  -She has no cardiopulmonary symptoms  -She does walk the dog and climb the stairs in her home without difficulties  -She had a prior surgical challenge in August 2023 under general anesthetic and had no difficulties  -She did have an abnormal EKG in the past so I will be ordering an EKG for comparison and if there are no changes I will clear her for surgery  -I told her she would not need to return unless we have problems and I will addend her report today for her surgeon

## 2024-02-10 DIAGNOSIS — F41.8 ANXIETY WITH DEPRESSION: ICD-10-CM

## 2024-02-10 DIAGNOSIS — I15.9 SECONDARY HYPERTENSION: ICD-10-CM

## 2024-02-12 ENCOUNTER — LAB (OUTPATIENT)
Dept: LAB | Facility: LAB | Age: 69
End: 2024-02-12
Payer: MEDICARE

## 2024-02-12 ENCOUNTER — HOSPITAL ENCOUNTER (OUTPATIENT)
Dept: CARDIOLOGY | Facility: HOSPITAL | Age: 69
Discharge: HOME | End: 2024-02-12
Payer: MEDICARE

## 2024-02-12 DIAGNOSIS — Z01.818 PREOPERATIVE CLEARANCE: ICD-10-CM

## 2024-02-12 LAB
ANION GAP SERPL CALC-SCNC: 13 MMOL/L (ref 10–20)
ATRIAL RATE: 76 BPM
BUN SERPL-MCNC: 23 MG/DL (ref 6–23)
CALCIUM SERPL-MCNC: 9.3 MG/DL (ref 8.6–10.3)
CHLORIDE SERPL-SCNC: 104 MMOL/L (ref 98–107)
CO2 SERPL-SCNC: 27 MMOL/L (ref 21–32)
CREAT SERPL-MCNC: 0.73 MG/DL (ref 0.5–1.05)
EGFRCR SERPLBLD CKD-EPI 2021: 89 ML/MIN/1.73M*2
GLUCOSE SERPL-MCNC: 100 MG/DL (ref 74–99)
P AXIS: 48 DEGREES
P OFFSET: 187 MS
P ONSET: 134 MS
POTASSIUM SERPL-SCNC: 4.1 MMOL/L (ref 3.5–5.3)
PR INTERVAL: 178 MS
Q ONSET: 223 MS
QRS COUNT: 13 BEATS
QRS DURATION: 84 MS
QT INTERVAL: 414 MS
QTC CALCULATION(BAZETT): 465 MS
QTC FREDERICIA: 447 MS
R AXIS: 29 DEGREES
SODIUM SERPL-SCNC: 140 MMOL/L (ref 136–145)
T AXIS: 40 DEGREES
T OFFSET: 430 MS
VENTRICULAR RATE: 76 BPM

## 2024-02-12 PROCEDURE — 93005 ELECTROCARDIOGRAM TRACING: CPT

## 2024-02-12 PROCEDURE — 36415 COLL VENOUS BLD VENIPUNCTURE: CPT

## 2024-02-12 PROCEDURE — 80048 BASIC METABOLIC PNL TOTAL CA: CPT

## 2024-02-12 PROCEDURE — 93010 ELECTROCARDIOGRAM REPORT: CPT | Performed by: STUDENT IN AN ORGANIZED HEALTH CARE EDUCATION/TRAINING PROGRAM

## 2024-02-12 RX ORDER — LISINOPRIL AND HYDROCHLOROTHIAZIDE 10; 12.5 MG/1; MG/1
1 TABLET ORAL DAILY
Qty: 90 TABLET | Refills: 1 | Status: SHIPPED | OUTPATIENT
Start: 2024-02-12 | End: 2024-04-03 | Stop reason: SDUPTHER

## 2024-02-12 RX ORDER — VENLAFAXINE HYDROCHLORIDE 150 MG/1
150 CAPSULE, EXTENDED RELEASE ORAL DAILY
Qty: 90 CAPSULE | Refills: 1 | Status: SHIPPED | OUTPATIENT
Start: 2024-02-12 | End: 2024-04-03 | Stop reason: SDUPTHER

## 2024-02-12 RX ORDER — METOPROLOL SUCCINATE 25 MG/1
25 TABLET, EXTENDED RELEASE ORAL DAILY
Qty: 90 TABLET | Refills: 1 | Status: SHIPPED | OUTPATIENT
Start: 2024-02-12 | End: 2024-04-03 | Stop reason: SDUPTHER

## 2024-02-13 NOTE — RESULT ENCOUNTER NOTE
I received the results of her EKG which came back essentially unremarkable.  I went ahead and addended her preop visit note from February 8 and could you please print that and fax it to Dr. Urban.  Indicate on the cover page that she has been cleared for surgery.  Please also let Caron know.  Thank you!

## 2024-02-21 ENCOUNTER — APPOINTMENT (OUTPATIENT)
Dept: RADIOLOGY | Facility: HOSPITAL | Age: 69
End: 2024-02-21
Payer: MEDICARE

## 2024-02-21 ENCOUNTER — HOSPITAL ENCOUNTER (EMERGENCY)
Facility: HOSPITAL | Age: 69
Discharge: HOME | End: 2024-02-22
Attending: EMERGENCY MEDICINE
Payer: MEDICARE

## 2024-02-21 DIAGNOSIS — S82.141A CLOSED FRACTURE OF RIGHT TIBIAL PLATEAU, INITIAL ENCOUNTER: Primary | ICD-10-CM

## 2024-02-21 PROCEDURE — 73564 X-RAY EXAM KNEE 4 OR MORE: CPT | Mod: RIGHT SIDE | Performed by: RADIOLOGY

## 2024-02-21 PROCEDURE — 99284 EMERGENCY DEPT VISIT MOD MDM: CPT | Mod: 25

## 2024-02-21 PROCEDURE — 73564 X-RAY EXAM KNEE 4 OR MORE: CPT | Mod: RT

## 2024-02-21 PROCEDURE — 2500000001 HC RX 250 WO HCPCS SELF ADMINISTERED DRUGS (ALT 637 FOR MEDICARE OP): Performed by: EMERGENCY MEDICINE

## 2024-02-21 PROCEDURE — 2500000001 HC RX 250 WO HCPCS SELF ADMINISTERED DRUGS (ALT 637 FOR MEDICARE OP): Performed by: PHYSICIAN ASSISTANT

## 2024-02-21 PROCEDURE — 73700 CT LOWER EXTREMITY W/O DYE: CPT | Mod: RT

## 2024-02-21 PROCEDURE — 73700 CT LOWER EXTREMITY W/O DYE: CPT | Mod: RIGHT SIDE | Performed by: RADIOLOGY

## 2024-02-21 RX ORDER — OXYCODONE AND ACETAMINOPHEN 5; 325 MG/1; MG/1
1 TABLET ORAL ONCE
Status: COMPLETED | OUTPATIENT
Start: 2024-02-21 | End: 2024-02-21

## 2024-02-21 RX ADMIN — OXYCODONE HYDROCHLORIDE AND ACETAMINOPHEN 1 TABLET: 5; 325 TABLET ORAL at 20:44

## 2024-02-21 RX ADMIN — OXYCODONE HYDROCHLORIDE AND ACETAMINOPHEN 1 TABLET: 5; 325 TABLET ORAL at 22:35

## 2024-02-21 ASSESSMENT — PAIN DESCRIPTION - LOCATION
LOCATION: KNEE
LOCATION: KNEE

## 2024-02-21 ASSESSMENT — ENCOUNTER SYMPTOMS
CHILLS: 0
HEMATURIA: 0
EYE PAIN: 0
SEIZURES: 0
ARTHRALGIAS: 0
COUGH: 0
SHORTNESS OF BREATH: 0
SORE THROAT: 0
VOMITING: 0
BACK PAIN: 0
PALPITATIONS: 0
DYSURIA: 0
FEVER: 0
ABDOMINAL PAIN: 0
COLOR CHANGE: 0

## 2024-02-21 ASSESSMENT — PAIN - FUNCTIONAL ASSESSMENT
PAIN_FUNCTIONAL_ASSESSMENT: 0-10

## 2024-02-21 ASSESSMENT — COLUMBIA-SUICIDE SEVERITY RATING SCALE - C-SSRS
6. HAVE YOU EVER DONE ANYTHING, STARTED TO DO ANYTHING, OR PREPARED TO DO ANYTHING TO END YOUR LIFE?: NO
1. IN THE PAST MONTH, HAVE YOU WISHED YOU WERE DEAD OR WISHED YOU COULD GO TO SLEEP AND NOT WAKE UP?: NO
2. HAVE YOU ACTUALLY HAD ANY THOUGHTS OF KILLING YOURSELF?: NO

## 2024-02-21 ASSESSMENT — PAIN SCALES - GENERAL
PAINLEVEL_OUTOF10: 5 - MODERATE PAIN
PAINLEVEL_OUTOF10: 8
PAINLEVEL_OUTOF10: 4

## 2024-02-21 ASSESSMENT — PAIN DESCRIPTION - DESCRIPTORS: DESCRIPTORS: ACHING

## 2024-02-21 ASSESSMENT — PAIN DESCRIPTION - ORIENTATION
ORIENTATION: RIGHT
ORIENTATION: RIGHT

## 2024-02-21 ASSESSMENT — PAIN DESCRIPTION - PAIN TYPE: TYPE: ACUTE PAIN

## 2024-02-22 VITALS
OXYGEN SATURATION: 98 % | DIASTOLIC BLOOD PRESSURE: 84 MMHG | HEIGHT: 63 IN | WEIGHT: 150 LBS | HEART RATE: 81 BPM | TEMPERATURE: 98 F | BODY MASS INDEX: 26.58 KG/M2 | SYSTOLIC BLOOD PRESSURE: 119 MMHG | RESPIRATION RATE: 18 BRPM

## 2024-02-22 RX ORDER — OXYCODONE AND ACETAMINOPHEN 5; 325 MG/1; MG/1
1 TABLET ORAL EVERY 6 HOURS PRN
Qty: 12 TABLET | Refills: 0 | Status: SHIPPED | OUTPATIENT
Start: 2024-02-22

## 2024-02-22 ASSESSMENT — PAIN - FUNCTIONAL ASSESSMENT: PAIN_FUNCTIONAL_ASSESSMENT: 0-10

## 2024-02-22 ASSESSMENT — PAIN SCALES - GENERAL: PAINLEVEL_OUTOF10: 2

## 2024-02-22 NOTE — DISCHARGE INSTRUCTIONS
Contact your orthopedic surgeon tomorrow morning for a follow-up appointment.  Remain nonweightbearing of your right lower leg.

## 2024-02-22 NOTE — ED PROVIDER NOTES
Emergency Medicine Transition of Care Note.    I received Caron Marc in signout from [facility].  Please see the previous ED provider note for all HPI, PE and MDM up to the time of signout at 10 PM. This is in addition to the primary record.    In brief Caron Marc is an 68 y.o. female presenting for   Chief Complaint   Patient presents with    Knee Injury     Patient states she was knocked over by her dog approx 45 min prior to arrival. Complains of right knee pain     At the time of signout we were awaiting: Results of CT scan of the knee and final disposition.  CT knee right wo IV contrast   Final Result   Acute lateral tibial plateau fracture as described above, with   approximately 2 mm depression of the articular surface.        Moderate sized lipohemarthrosis.             MACRO:   None        Signed by: Nano Pandya 2/21/2024 10:43 PM   Dictation workstation:   [id number]      XR knee right 4+ views   Final Result   1.  Acute, nondisplaced, fracture at the right lateral tibial plateau   with moderate lipohemarthrosis at the knee joint.                  MACRO:   None.        Signed by: Adore Soto 2/21/2024 9:19 PM   Dictation workstation:   [id number]         Diagnoses as of 02/22/24 0006   Closed fracture of right tibial plateau, initial encounter       Medical Decision Making  Patient was checked out to me by the previous provider pending results of the CT scan of the right knee.  It demonstrated 2 mm of depression of the right tibial plateau.  I spoke to our orthopedic surgeon on-call Dr. Stallings who felt this was nonsurgical and recommended nonweightbearing with a knee immobilizer and follow-up in the office.  Will provide a prescription for Percocet at discharge.        Final diagnoses:   [S82.141A] Closed fracture of right tibial plateau, initial encounter           Procedure  Procedures    MD Jey Anderson MD  02/22/24 0006

## 2024-02-22 NOTE — ED PROVIDER NOTES
Patient is a 68-year-old female who presents to the emergency department with a chief complaint of right knee pain.  She states that prior to arrival she was knocked over by her dog.  She states that her dog ran into her right knee knocking her over.  She denies hitting her head or loss of consciousness. She states that she is unable to put any pressure on her leg. She denies any other injuries           Review of Systems   Constitutional:  Negative for chills and fever.   HENT:  Negative for ear pain and sore throat.    Eyes:  Negative for pain and visual disturbance.   Respiratory:  Negative for cough and shortness of breath.    Cardiovascular:  Negative for chest pain and palpitations.   Gastrointestinal:  Negative for abdominal pain and vomiting.   Genitourinary:  Negative for dysuria and hematuria.   Musculoskeletal:  Negative for arthralgias and back pain.        Knee pain   Skin:  Negative for color change and rash.   Neurological:  Negative for seizures and syncope.   All other systems reviewed and are negative.       Physical Exam  Vitals and nursing note reviewed.   Constitutional:       General: She is not in acute distress.     Appearance: Normal appearance. She is well-developed.   HENT:      Head: Normocephalic and atraumatic.   Eyes:      Conjunctiva/sclera: Conjunctivae normal.   Cardiovascular:      Rate and Rhythm: Normal rate and regular rhythm.      Heart sounds: No murmur heard.  Pulmonary:      Effort: Pulmonary effort is normal. No respiratory distress.      Breath sounds: Normal breath sounds.   Abdominal:      General: There is no distension.      Palpations: Abdomen is soft. There is no mass.      Tenderness: There is no abdominal tenderness.      Hernia: No hernia is present.   Musculoskeletal:         General: No swelling.      Cervical back: Normal range of motion and neck supple. No swelling, deformity, signs of trauma, rigidity, spasms, tenderness or bony tenderness. Normal range of  motion.      Thoracic back: No swelling, edema, deformity, signs of trauma, lacerations, spasms or tenderness. Normal range of motion.      Lumbar back: No swelling, edema, deformity, signs of trauma, lacerations, spasms, tenderness or bony tenderness. Normal range of motion. No scoliosis.      Right knee: No swelling, deformity, effusion, erythema, ecchymosis or lacerations. Decreased range of motion. Tenderness present over the medial joint line. Normal pulse.      Left knee: Normal pulse.      Right lower leg: No swelling. No edema.      Left lower leg: No swelling. No edema.   Skin:     General: Skin is warm and dry.      Capillary Refill: Capillary refill takes less than 2 seconds.   Neurological:      General: No focal deficit present.      Mental Status: She is alert and oriented to person, place, and time.      Cranial Nerves: No cranial nerve deficit.      Sensory: No sensory deficit.      Motor: No weakness.   Psychiatric:         Mood and Affect: Mood normal.          Labs Reviewed - No data to display     XR knee right 4+ views   Final Result   1.  Acute, nondisplaced, fracture at the right lateral tibial plateau   with moderate lipohemarthrosis at the knee joint.                  MACRO:   None.        Signed by: Adore Soto 2/21/2024 9:19 PM   Dictation workstation:   [id number]      CT knee right wo IV contrast    (Results Pending)        Procedures     Medical Decision Making  Patient is a 68-year-old female who presents to the emergency department with a chief complaint of right knee pain after an injury which was sustained this evening when her dog ran into her right knee.  X-ray shows a tibial plateau fracture.  Consulted with orthopedic physician on-call, Dr. Adam Spears who recommended a CT scan be performed.  CT scan of the right knee is pending at this time.  Patient care will be transitioned to attending physician, Dr. Bueno at 2200.    Amount and/or Complexity of Data  Reviewed  Radiology: ordered. Decision-making details documented in ED Course.         Diagnoses as of 02/21/24 2203   Closed fracture of right tibial plateau, initial encounter

## 2024-02-29 ENCOUNTER — PREP FOR PROCEDURE (OUTPATIENT)
Dept: OPERATING ROOM | Facility: HOSPITAL | Age: 69
End: 2024-02-29
Payer: MEDICARE

## 2024-02-29 DIAGNOSIS — H25.812 COMBINED FORMS OF AGE-RELATED CATARACT OF LEFT EYE: Primary | ICD-10-CM

## 2024-02-29 RX ORDER — TETRACAINE HYDROCHLORIDE 5 MG/ML
1 SOLUTION OPHTHALMIC ONCE
Status: CANCELLED | OUTPATIENT
Start: 2024-03-07 | End: 2024-02-29

## 2024-02-29 RX ORDER — PREDNISOLONE ACETATE 10 MG/ML
1 SUSPENSION/ DROPS OPHTHALMIC ONCE
Status: CANCELLED | OUTPATIENT
Start: 2024-03-07 | End: 2024-02-29

## 2024-02-29 RX ORDER — POVIDONE-IODINE 5 %
SOLUTION, NON-ORAL OPHTHALMIC (EYE) ONCE
Status: CANCELLED | OUTPATIENT
Start: 2024-03-07 | End: 2024-02-29

## 2024-02-29 RX ORDER — KETOROLAC TROMETHAMINE 5 MG/ML
1 SOLUTION OPHTHALMIC
Status: CANCELLED | OUTPATIENT
Start: 2024-03-07 | End: 2024-03-07

## 2024-03-04 NOTE — PREPROCEDURE INSTRUCTIONS
No outpatient medications have been marked as taking for the 3/7/24 encounter (Hospital Encounter).                       NPO Instructions:    Nothing to eat or drink after midnight    Additional Instructions:     Will need  home, will receive call day before surgery with arrival time

## 2024-03-06 ENCOUNTER — ANESTHESIA EVENT (OUTPATIENT)
Dept: OPERATING ROOM | Facility: HOSPITAL | Age: 69
End: 2024-03-06
Payer: MEDICARE

## 2024-03-07 ENCOUNTER — HOSPITAL ENCOUNTER (OUTPATIENT)
Facility: HOSPITAL | Age: 69
Setting detail: OUTPATIENT SURGERY
Discharge: HOME | End: 2024-03-07
Attending: OPHTHALMOLOGY | Admitting: OPHTHALMOLOGY
Payer: MEDICARE

## 2024-03-07 ENCOUNTER — ANESTHESIA (OUTPATIENT)
Dept: OPERATING ROOM | Facility: HOSPITAL | Age: 69
End: 2024-03-07
Payer: MEDICARE

## 2024-03-07 VITALS
HEART RATE: 77 BPM | OXYGEN SATURATION: 96 % | RESPIRATION RATE: 16 BRPM | WEIGHT: 155 LBS | DIASTOLIC BLOOD PRESSURE: 87 MMHG | SYSTOLIC BLOOD PRESSURE: 109 MMHG | BODY MASS INDEX: 27.46 KG/M2 | TEMPERATURE: 97.4 F

## 2024-03-07 PROCEDURE — 2500000004 HC RX 250 GENERAL PHARMACY W/ HCPCS (ALT 636 FOR OP/ED): Performed by: OPHTHALMOLOGY

## 2024-03-07 PROCEDURE — 7100000010 HC PHASE TWO TIME - EACH INCREMENTAL 1 MINUTE: Performed by: OPHTHALMOLOGY

## 2024-03-07 PROCEDURE — C1780 LENS, INTRAOCULAR (NEW TECH): HCPCS | Performed by: OPHTHALMOLOGY

## 2024-03-07 PROCEDURE — 3600000003 HC OR TIME - INITIAL BASE CHARGE - PROCEDURE LEVEL THREE: Performed by: OPHTHALMOLOGY

## 2024-03-07 PROCEDURE — 3700000002 HC GENERAL ANESTHESIA TIME - EACH INCREMENTAL 1 MINUTE: Performed by: OPHTHALMOLOGY

## 2024-03-07 PROCEDURE — 2760000001 HC OR 276 NO HCPCS: Performed by: OPHTHALMOLOGY

## 2024-03-07 PROCEDURE — 2720000007 HC OR 272 NO HCPCS: Performed by: OPHTHALMOLOGY

## 2024-03-07 PROCEDURE — 2500000004 HC RX 250 GENERAL PHARMACY W/ HCPCS (ALT 636 FOR OP/ED): Performed by: ANESTHESIOLOGY

## 2024-03-07 PROCEDURE — 3600000008 HC OR TIME - EACH INCREMENTAL 1 MINUTE - PROCEDURE LEVEL THREE: Performed by: OPHTHALMOLOGY

## 2024-03-07 PROCEDURE — 2500000001 HC RX 250 WO HCPCS SELF ADMINISTERED DRUGS (ALT 637 FOR MEDICARE OP): Performed by: OPHTHALMOLOGY

## 2024-03-07 PROCEDURE — 7100000009 HC PHASE TWO TIME - INITIAL BASE CHARGE: Performed by: OPHTHALMOLOGY

## 2024-03-07 PROCEDURE — 3700000001 HC GENERAL ANESTHESIA TIME - INITIAL BASE CHARGE: Performed by: OPHTHALMOLOGY

## 2024-03-07 DEVICE — LENS, INTRAOCULAR, SN60WF 21.0: Type: IMPLANTABLE DEVICE | Site: LENS | Status: FUNCTIONAL

## 2024-03-07 RX ORDER — FENTANYL CITRATE 50 UG/ML
INJECTION, SOLUTION INTRAMUSCULAR; INTRAVENOUS AS NEEDED
Status: DISCONTINUED | OUTPATIENT
Start: 2024-03-07 | End: 2024-03-07

## 2024-03-07 RX ORDER — PREDNISOLONE ACETATE 10 MG/ML
1 SUSPENSION/ DROPS OPHTHALMIC ONCE
Status: COMPLETED | OUTPATIENT
Start: 2024-03-07 | End: 2024-03-07

## 2024-03-07 RX ORDER — KETOROLAC TROMETHAMINE 5 MG/ML
1 SOLUTION OPHTHALMIC
Status: COMPLETED | OUTPATIENT
Start: 2024-03-07 | End: 2024-03-07

## 2024-03-07 RX ORDER — MIDAZOLAM HYDROCHLORIDE 1 MG/ML
2 INJECTION INTRAMUSCULAR; INTRAVENOUS ONCE AS NEEDED
Status: COMPLETED | OUTPATIENT
Start: 2024-03-07 | End: 2024-03-07

## 2024-03-07 RX ORDER — TETRACAINE HYDROCHLORIDE 5 MG/ML
1 SOLUTION OPHTHALMIC ONCE
Status: COMPLETED | OUTPATIENT
Start: 2024-03-07 | End: 2024-03-07

## 2024-03-07 RX ORDER — POVIDONE-IODINE 5 %
SOLUTION, NON-ORAL OPHTHALMIC (EYE) ONCE
Status: COMPLETED | OUTPATIENT
Start: 2024-03-07 | End: 2024-03-07

## 2024-03-07 RX ORDER — SODIUM CHLORIDE, SODIUM LACTATE, POTASSIUM CHLORIDE, CALCIUM CHLORIDE 600; 310; 30; 20 MG/100ML; MG/100ML; MG/100ML; MG/100ML
100 INJECTION, SOLUTION INTRAVENOUS CONTINUOUS
Status: DISCONTINUED | OUTPATIENT
Start: 2024-03-07 | End: 2024-03-07 | Stop reason: HOSPADM

## 2024-03-07 RX ADMIN — KETOROLAC TROMETHAMINE 1 DROP: 5 SOLUTION OPHTHALMIC at 10:52

## 2024-03-07 RX ADMIN — FENTANYL CITRATE 50 MCG: 50 INJECTION, SOLUTION INTRAMUSCULAR; INTRAVENOUS at 11:48

## 2024-03-07 RX ADMIN — MIDAZOLAM HYDROCHLORIDE 1 MG: 1 INJECTION, SOLUTION INTRAMUSCULAR; INTRAVENOUS at 11:41

## 2024-03-07 RX ADMIN — MIDAZOLAM HYDROCHLORIDE 1 MG: 1 INJECTION, SOLUTION INTRAMUSCULAR; INTRAVENOUS at 11:48

## 2024-03-07 RX ADMIN — PHENYLEPHRINE HYDROCHLORIDE 1 DROP: 100 SOLUTION/ DROPS OPHTHALMIC at 10:26

## 2024-03-07 RX ADMIN — POVIDONE-IODINE: 5 SOLUTION OPHTHALMIC at 10:26

## 2024-03-07 RX ADMIN — POLYVINYL ALCOHOL, POVIDONE 1 DROP: 14; 6 SOLUTION/ DROPS OPHTHALMIC at 10:32

## 2024-03-07 RX ADMIN — FENTANYL CITRATE 50 MCG: 50 INJECTION, SOLUTION INTRAMUSCULAR; INTRAVENOUS at 11:41

## 2024-03-07 RX ADMIN — KETOROLAC TROMETHAMINE 1 DROP: 5 SOLUTION OPHTHALMIC at 10:43

## 2024-03-07 RX ADMIN — PHENYLEPHRINE HYDROCHLORIDE 1 DROP: 100 SOLUTION/ DROPS OPHTHALMIC at 10:53

## 2024-03-07 RX ADMIN — KETOROLAC TROMETHAMINE 1 DROP: 5 SOLUTION OPHTHALMIC at 10:26

## 2024-03-07 RX ADMIN — POLYVINYL ALCOHOL, POVIDONE 1 DROP: 14; 6 SOLUTION/ DROPS OPHTHALMIC at 10:43

## 2024-03-07 RX ADMIN — SODIUM CHLORIDE, POTASSIUM CHLORIDE, SODIUM LACTATE AND CALCIUM CHLORIDE 100 ML/HR: 600; 310; 30; 20 INJECTION, SOLUTION INTRAVENOUS at 10:30

## 2024-03-07 RX ADMIN — POLYVINYL ALCOHOL, POVIDONE 1 DROP: 14; 6 SOLUTION/ DROPS OPHTHALMIC at 10:53

## 2024-03-07 RX ADMIN — PHENYLEPHRINE HYDROCHLORIDE 1 DROP: 100 SOLUTION/ DROPS OPHTHALMIC at 10:43

## 2024-03-07 RX ADMIN — TETRACAINE HYDROCHLORIDE 1 DROP: 5 SOLUTION OPHTHALMIC at 10:26

## 2024-03-07 RX ADMIN — POLYVINYL ALCOHOL, POVIDONE 1 DROP: 14; 6 SOLUTION/ DROPS OPHTHALMIC at 10:26

## 2024-03-07 RX ADMIN — KETOROLAC TROMETHAMINE 1 DROP: 5 SOLUTION OPHTHALMIC at 10:31

## 2024-03-07 RX ADMIN — PHENYLEPHRINE HYDROCHLORIDE 1 DROP: 100 SOLUTION/ DROPS OPHTHALMIC at 10:31

## 2024-03-07 SDOH — HEALTH STABILITY: MENTAL HEALTH: CURRENT SMOKER: 0

## 2024-03-07 ASSESSMENT — COLUMBIA-SUICIDE SEVERITY RATING SCALE - C-SSRS
2. HAVE YOU ACTUALLY HAD ANY THOUGHTS OF KILLING YOURSELF?: NO
1. IN THE PAST MONTH, HAVE YOU WISHED YOU WERE DEAD OR WISHED YOU COULD GO TO SLEEP AND NOT WAKE UP?: NO
6. HAVE YOU EVER DONE ANYTHING, STARTED TO DO ANYTHING, OR PREPARED TO DO ANYTHING TO END YOUR LIFE?: NO

## 2024-03-07 ASSESSMENT — PAIN - FUNCTIONAL ASSESSMENT: PAIN_FUNCTIONAL_ASSESSMENT: 0-10

## 2024-03-07 ASSESSMENT — PAIN SCALES - GENERAL
PAIN_LEVEL: 3
PAINLEVEL_OUTOF10: 0 - NO PAIN

## 2024-03-07 NOTE — OP NOTE
Phacoemulsification Cataract with Insertion Intraocular Lens (L) Operative Note     Date: 3/7/2024  OR Location: [facility] OR    Name: Caron Marc, : 1955, Age: 68 y.o., MRN: [id number], Sex: female    Diagnosis  Pre-op Diagnosis     * Combined forms of age-related cataract of left eye [H25.812] Post-op Diagnosis     * Combined forms of age-related cataract of left eye [H25.812]     Procedures  Phacoemulsification Cataract with Insertion Intraocular Lens  85884 - MN XCAPSL CTRC RMVL INSJ IO LENS PROSTH W/O ECP      Surgeons      * Grant Urban - Primary    Resident/Fellow/Other Assistant:  Surgeon(s) and Role:    Procedure Summary  Anesthesia: Monitor Anesthesia Care  ASA: II  Anesthesia Staff: Anesthesiologist: Cachorro Kelley MD  Estimated Blood Loss: None  Intra-op Medications:   Administrations occurring from 1140 to 1220 on 24:   Medication Name Total Dose   lidocaine 1%-phenylephrine 1.5% intravitreal injection 2 mL 2 mL   moxifloxacin (Vigamox) 1.5 mg/1 mL (0.15%) injection 1.5 mg 1.5 mg   prednisoLONE acetate (Pred-Forte) 1 % ophthalmic suspension 1 drop 1 drop   midazolam (Versed) injection 2 mg 2 mg     Anesthesia Record         None      Specimen: No specimens collected     Staff:   Circulator: Tammy Gonzalez RN  Scrub Person: Pete Montanez    Drains and/or Catheters: * None in log *    Implants:  Implants       Type Name Action Serial No.      Lens LENS, INTRAOCULAR, SN60WF 21.0 - [id number] - [id number] Implanted [id number]      Findings: Combined form age related cataract left eye    Indications: Caron Marc is an 68 y.o. female who is having surgery for Combined forms of age-related cataract of left eye [H25.812]. Blurred vision left eye for near and for distance.  Difficulty seeing to read and watch TV left eye.  Difficulty seeing to drive, complains of glare with left eye.     The patient was seen in the preoperative area. The risks, benefits, complications, treatment  options, non-operative alternatives, expected recovery and outcomes were discussed with the patient. The possibilities of reaction to medication, pulmonary aspiration, injury to surrounding structures, bleeding, recurrent infection, the need for additional procedures, failure to diagnose a condition, and creating a complication requiring transfusion or operation were discussed with the patient. The patient concurred with the proposed plan, giving informed consent.  The site of surgery was properly noted/marked if necessary per policy. The patient has been actively warmed in preoperative area. Preoperative antibiotics are not indicated. Venous thrombosis prophylaxis are not indicated.    Procedure Details: The patient was correctly identified in the preop area and the operative eye was marked with a marking pen. The operative eye was dilated in the preoperative area.  The patient was then taken to the operating room where timeout was performed before starting the procedure. Combined anesthesia  with intravenous sedation and topical tetracaine eyedrops were given the left eye. The operative eye was prepped and draped in the standard sterile ophthalmic fashion in  preparation for ophthalmic surgery.  A Chiqui wire speculum was then inserted between the eyelids of the left eye and the operating microscope was placed over the left eye.  A paracentesis incision was made approximately 30° away from the planned surgical incision site with the help of MVR blade.  1% lidocaine MPF with Phenylephrine 1.5% PF was injected into the anterior chamber through the paracentesis incision. A near limbal clear corneal incision was fashioned in the temporal quadrant just outside the vascular arcade and Viscoat was injected into anterior chamber to firm the eye. A bent needle cystotome was used and Utrata forceps were utilized to create a continuous curvilinear capsulorrhexis.  BSS was injected beneath the anterior capsule to  hydrodissect the nucleus from adjacent cortex and capsule.  The residual cortex were then aspirated with irrigation aspiration handpiece. The posterior capsule was then polished with the help of soft irrigation-aspiration tip.  Provisc viscoelastic was then injected into the eye to reform the anterior chamber and to open the capsular bag.  The intraocular lens implant was taken from its sterile wrapping, inspected under the surgical microscope and found to be in good condition. The intraocular lens implant +21.0D was injected into the capsule bag. The Provisc was then aspirated from the anterior chamber and from behind the intraocular lens implant.  The anterior chamber was inflated with the help of BSS to moderate tension.  The edges of the surgical incision were then hydrated with the help of BSS.  Vigamox was then injected into the anterior chamber and into the capsule bag through the paracentesis incision. The surgical wound was then inspected and found to be watertight.  The wire speculum and drapes were then removed.  Pred Forte eyedrops, Acular eyedrops and Betadine 5% sterile ophthalmic solution were instilled in the conjunctival sac.     The patient tolerated the procedure well and was taken to recovery room in stable condition.    Complications:  None; patient tolerated the procedure well.    Disposition:  Home  Condition: stable     Attending Attestation: I performed the procedure.    Grant Urban  Phone Number: [phone]

## 2024-03-07 NOTE — ANESTHESIA POSTPROCEDURE EVALUATION
Patient: Caron Marc    Procedure Summary       Date: 03/07/24 Room / Location: [facility] OR 05 / [facility]    Anesthesia Start: 1137 Anesthesia Stop: 1201    Procedure: Phacoemulsification Cataract with Insertion Intraocular Lens (Left: Eye) Diagnosis:       Combined forms of age-related cataract of left eye      (Combined forms of age-related cataract of left eye [H25.812])    Surgeons: Grant Urban MD Responsible Provider: Cachorro Kelley MD    Anesthesia Type: MAC ASA Status: 2            Anesthesia Type: MAC    Vitals Value Taken Time   BP  03/07/24 1201   Temp  03/07/24 1201   Pulse  03/07/24 1201   Resp  03/07/24 1201   SpO2  03/07/24 1201       Anesthesia Post Evaluation    Patient location during evaluation: PACU  Patient participation: complete - patient participated  Level of consciousness: awake and alert  Pain score: 3  Pain management: adequate  Multimodal analgesia pain management approach  Airway patency: patent  Cardiovascular status: acceptable  Respiratory status: acceptable  Hydration status: acceptable  Postoperative Nausea and Vomiting: none        There were no known notable events for this encounter.

## 2024-03-07 NOTE — H&P
H&P Notes  - documented in this encounter  Grant Urban MD - 2024 10:56 AM EST  Formatting of this note is different from the original.  HISTORY AND PHYSICAL EXAMINATION    SERVICE DATE: 2024  SERVICE TIME: 10:57 AM    PRIMARY CARE PHYSICIAN: Eleanor Downs DO    REASON FOR VISIT:  Caron Marc is a 68 year old female who is being seen for Combined form Age-Related Cataract    The patient has the following:  ACTIVE PROBLEM LIST  Hypothyroidism  Hypertension  Thyroid Nodule  Depression  Impaired Fasting Glucose  Osteopenia  Mixed Hyperlipidemia  Sleep Apnea  Obesity, Class I, Bmi 30-34.9  Abnormal Liver Function Tests  Osteoporosis, Unspecified    SUBJECTIVE  CHIEF COMPLAINT: Combined form Age-Related cataract left eye  HPI: Blurred vision, glare, halos, difficulty reading small and large print, trouble seeing road signs during the day and at night.    PAST MEDICAL HISTORY  Diagnosis Date  Arthritis  CMC arthritis 2014  Depression  Hypercholesteremia  Hypertension  Hypothyroidism  Obesity 2018  Right homonymous hemianopsia 2016  Sleep apnea  Visual field loss 2016  Vitreous floaters of both eyes 2016    PAST SURGICAL HISTORY  Procedure Laterality Date  HYSTERECTOMY HX    FAMILY HISTORY  Problem Relation Age of Onset  No Ocular Disease Father  Glaucoma Mother  other (thyroid cancer[other]) Daughter    SOCIAL HISTORY:  Social History    Tobacco Use  Smoking status: Former  Packs/day: 1.00  Years: 20.00  Additional pack years: 0.00  Total pack years: 20.00  Types: Cigarettes  Quit date: 2003  Years since quittin.0  Smokeless tobacco: Never  Substance Use Topics  Alcohol use: Yes  Alcohol/week: 7.0 standard drinks of alcohol  Types: 7 Glasses of Wine (5oz) per week  Drug use: No    MEDICATIONS:  Prior to Admission medications as of 24 1048  Medication Sig Last Dose Taking  oxyCODONE-acetaminophen (PERCOCET) 5-325 mg tablet  Yes  lisinopril-hydroCHLOROthiazide (ZESTORETIC) 10-12.5 mg per tablet Yes  metoprolol succinate ER (TOPROL XL) 25 mg 24 hr tablet Yes  montelukast (SINGULAIR) 10 mg tablet Yes  traZODone (DESYREL) 100 mg tablet Yes  erythromycin (ROMYCIN) 5 mg/gram (0.5 %) ophthalmic ointment Use 1 application in both eyes daily at bedtime. Yes  fluorometholone (FML LIQUID FILM) 0.1 % ophthalmic suspension Use 1 Drop in both eyes three times a day. Yes  levothyroxine (LEVOXYL) 88 mcg tablet Take 1 tablet by mouth once daily. Yes  Potassium 99 mg tab Take 1 tablet by mouth once daily. Yes  atorvastatin (LIPITOR) 40 mg tablet Take 1 tablet by mouth every other day. Yes  lisinopril (ZESTRIL, PRINIVIL) 20 mg tablet Take 1 tablet by mouth once daily. Yes  CPAP Yes  venlafaxine (EFFEXOR) 75 mg tablet Take 225 mg by mouth once daily. Yes  MULTIVIT WITH CALCIUM,IRON,MIN (WOMEN'S MULTIPLE VITAMINS ORAL) Take by mouth.  Yes  aspirin, enteric coated (ASPIRIN, ENTERIC COATED) 81 mg EC tablet Take 1 tablet by mouth every other day. Yes    No medication comments found.    CURRENT ALLERGIES:  ALLERGIES  Allergen Reactions  Alendronate Other: See Comments  Chills, aches and pains.  Codeine Other: See Comments  Ringing in ears    REVIEW OF SYSTEMS:  PAIN ASSESSMENT:  General: No weight loss, malaise or fevers.  Neuro: See HPI  Respiratory: No history of current cough or dyspnea, or pneumonia in the past 6 weeks. No history of respiratory/pulmonary symptoms or problems  Cardiovascular: Positive for: Hypertension  GI: No history of GI symptoms or problems. No history of esophageal varices, recent ascites, or ETOH greater than 2 drinks per day.  : No history of UTI in past 6 weeks. No history of renal failure. Not currently on or requiring dialysis. No history of  symptoms or problems.  GYN: Negative for abnormal vaginal bleeding, abnormal vaginal discharge.  Pregnancy: N/A  Endocrine: No history of diabetes. Has not taken steroids within the past  30 days. No history of endocrinological symptoms or problems.  Hematology: No history of bleeding or clotting disorder. Pt is not taking anti-coagulation or platelet medications. No history of hematological symptoms or problems.  Oncology: No history of CA metastasis, chemo within 30 days, or radiotherapy within 90 days. Has not lost 10% of body wt in 6 months. No history of oncological symptoms or problems.  Psych: No history of psychiatric symptoms or problems.  Musculoskeletal: Negative for joint pain or swelling, back pain or muscle pain.  Skin: Negative for lesions, rash and itching.    PHYSICAL EXAM:  VITALS:  There were no vitals taken for this visit.        General: Alert and oriented  Skin: Normal color, no rash, no lesions.  HEENT: EOM, pupils equal, round and reactive.  Cardiovascular: Normal S1 & S2, no rubs, murmurs or gallops. No JVD. Pulse regular.  Lungs: Normal breath sounds, no wheezes or crackles.  Abdomen: Soft, non-tender, no rigidity.  Extremities: No deformity, no edema or tenderness, no joint swelling or clubbing.  Neurological: Normal cognition and motor skills.  Pulses: Carotid and radial pulses normal +2.    Diagnostic tests reviewed for today's visit:  N/A    ASSESSMENT  Medication and Non-Pharmacologic VTE Prophylaxis/Anticoagulants      VTE Prophylaxis: N/A    Impression: There is no known pertinent medical condition which may affect manny-operative course    Clinical Risk Factors for Possible Cardiac Complications:  None    Patient is scheduled for a low-risk procedure.    FUNCTIONAL STATUS: Walk indoors, such as around the house (1.75 METs)    Functional Class (NYHA): N/A    [facility]: N/A    PLAN  CONSULTS:  Patient does not require consults for optimization at this time.    The Following Tests/Procedures Have Been Initiated:  None    Instructions Given to Patient:  Patient given verbal and written preop instructions and voices comprehension and compliance.    SIGNATURE: Grant  MD [place] PATIENT NAME: Caron Marc  DATE: February 28, 2024 MRN: [id number]  TIME: 10:56 AM PAGER/CONTACT #:    Electronically signed by Grant Urban MD at 02/28/2024 11:06 AM EST   Source Comments  - [facility]  In the event this information is protected by the Federal Confidentiality of Alcohol and Drug Abuse Patient Records regulations: This information has been disclosed to you from records protected by Federal confidentiality rules (42 CFR Part 2). The Federal rules prohibit you from making any further disclosure of this information unless further disclosure is expressly permitted by the written consent of the person to whom it pertains or as otherwise permitted by 42 CFR Part 2. A general authorization for the release of medical or other information is NOT sufficient for this purpose. The Federal rules restrict any use of the information to criminally investigate or prosecute any alcohol or drug abuse patient.  Reason for Visit    Reason for Visit -  Reason Comments   Cataract Follow Up Patient is here today as directed to sign consents for Cataract surgery for Left eye.   Blurred Vision Both Eyes     Difficulty Reading Both Eyes     Glare Both Eyes     Encounter Details    Encounter Details  Date Type Department Care Team (Latest Contact Info) Description   02/28/2024 10:45 AM EST Office Visit [facility] Ophthalmology   [address] [place], [place] [id number]   [phone]  Grant Urban MD   [address] [place] [address]   [phone] (Work)   [phone] (Fax)  Combined form of age-related cataract, left eye (Primary Dx);  Combined form of age-related cataract, right eye;  Glaucoma suspect of both eyes;  Optic cupping of both eyes;  Sleep apnea, unspecified type;  Essential hypertension;  Mixed hyperlipidemia     Social History  - documented as of this encounter  Social History  Tobacco Use Types Packs/Day Years Used Date   Smoking Tobacco: Former Cigarettes 1 20 Quit: 02/02/2003    Smokeless Tobacco: Never             Social History  Alcohol Use Standard Drinks/Week Comments   Yes 7 (1 standard drink = 0.6 oz pure alcohol)       Social History  Area Deprivation Index Answer Date Recorded   National Score (1-100), lower number is lower risk 45 09/19/2023   State Score (1-10), lower number is lower risk 2 09/19/2023   Data from: [email or web address]/. Last address used for calculation [address] 09/19/2023     Social History  Sex and Gender Information Value Date Recorded   Sex Assigned at Birth Not on file     Gender Identity Not on file     Sexual Orientation Not on file       Social History  Job Start Date Occupation Industry   Not on file Not on file Not on file     Last Filed Vital Signs  - documented in this encounter  Last Filed Vital Signs  Vital Sign Reading Time Taken Comments   Blood Pressure 101/64 02/28/2024 11:03 AM EST     Pulse 80 02/28/2024 11:03 AM EST     Temperature - -     Respiratory Rate - -     Oxygen Saturation - -     Inhaled Oxygen Concentration - -     Weight - -     Height - -     Body Mass Index - -       Functional Status  - documented as of this encounter  Functional Status  Functional Status Response Date of Assessment   Are you deaf or do you have serious difficulty hearing? No 04/13/2015   Are you blind or do you have serious difficulty seeing, even when wearing glasses? No 04/13/2015   Do you have serious difficulty walking or climbing stairs? No 04/13/2015   Do you have difficulty dressing or bathing? No 04/13/2015   Because of a physical, mental, or emotional condition, do you have difficulty doing errands alone such as visiting a doctor's office or shopping? No 04/13/2015     Functional Status  Cognitive Status Response Date of Assessment   Because of a physical, mental, or emotional condition, do you have serious difficulty concentrating, remembering, or making decisions? No 04/13/2015     Patient Instructions  -  documented in this encounter  Patient Instructions  Grant Urban MD - 02/28/2024 10:55 AM EST   Formatting of this note is different from the original.  Current Ophthalmic Meds        erythromycin (ROMYCIN) 5 mg/gram (0.5 %) ophthalmic ointment Use 1 application in both eyes daily at bedtime.  fluorometholone (FML LIQUID FILM) 0.1 % ophthalmic suspension Use 1 Drop in both eyes three times a day.    Continue:  Systane Complete solution instill 1 drop 3 times daily Both Eyes.    If you have any questions please contact our office at [phone].  After office hours or on the weekend, please call Dr. Urban on his cell phone at [phone].    Electronically signed by Grant Urban MD at 02/28/2024 10:55 AM EST     Progress Notes  - documented in this encounter  Grant Urban MD - 02/28/2024 10:49 AM EST  Formatting of this note is different from the original.  ASSESSMENT/PLAN:  1. Combined form of age-related cataract, left eye - ICD9: 366.19, ICD10: H25.812 (primary diagnosis)    PHYSICAL EXAM:    Vital Signs:  Blood pressure 101/64, pulse 80.    Respiratory:  Normal breath sounds, no wheezing.    CARD:  Normal heart sounds 1 & 2, normal sinus rhythm.    Reviewed Dr. Eastman's examination and notes    Cataract Presurgical Documentation    Cataract: Left eye (OS) then Right eye (OD)    Current Visual Acuity  Right Eye Distance CC 20/40  Left Eye Distance CC 20/40    Current Ophthalmic Meds        erythromycin (ROMYCIN) 5 mg/gram (0.5 %) ophthalmic ointment Use 1 application in both eyes daily at bedtime.  fluorometholone (FML LIQUID FILM) 0.1 % ophthalmic suspension Use 1 Drop in both eyes three times a day.    Continue:  Systane Complete solution instill 1 drop 3 times daily Both Eyes.    Visual Function: Caron Marc states that the decline in vision from the cataract impedes her abilities as listed in the HPI, as well as other activities of daily living.    Caron Marc has confirmed  that she is no longer able to function adequately on a day-to-day basis because of her current visual condition.    Further, it is my medical opinion that the cataract is the primary cause, or at least a significantly contributory cause of her visual dysfunction. With uncomplicated cataract surgery and lens implantation, it is my expectation that her visual function and quality of life will improve, significantly.    The risks, benefits, alternatives, personnel and complications of cataract surgery with lens implantation were discussed with Caron Marc in detail. she appeared to understand and asked that I proceed with plans for surgery.    Upon eye examination, patient was found to have a visually significant cataract left eye . Discussed cataract surgery with patient and different intraocular lens implant options with patient: basic monofocal intraocular lens implant, Toric intraocular lens implant, and presbyopia correction intraocular lens implant. In my medical opinion, based on medical history and ocular examination, cataract surgery with intraocular lens implant will correct patient's vision and improve quality of patient's daily living activities. Patient wishes to have traditional cataract surgery with basic intraocular lens left eye 03/07/2024. Patient wishes to have cataract surgery with the option stated above. Patient understands that an intraocular lens implant does not necessarily replace the need for glasses. Patient understands that it is impossible for the surgeon to inform him/her of every possible complication that may occur. The surgeon has answered all of the patient's questions. Patient understands that if he/she has a mature or dense cataract, pseudoexfoliation cataract, or history of use of Flomax, he/she may require the use of Maluyugin Ring and/or Vision Blue during surgery. Patient understands the risks, benefits, and alternatives to surgery.    Reviewed Dr. Downs's notes from  Physical Exam.    Patient scheduled for Cataract extraction with insertion of basic PC Intraocular lens implant left eye on 03/07/2024 at [facility].    +Patient instructed to bring post-operative medications to 1 day post op appointment.    2. Combined form of age-related cataract, right eye - ICD9: 366.19, ICD10: H25.811    Plan cataract surgery right eye when left eye is stable    Current Ophthalmic Meds        erythromycin (ROMYCIN) 5 mg/gram (0.5 %) ophthalmic ointment Use 1 application in both eyes daily at bedtime.  fluorometholone (FML LIQUID FILM) 0.1 % ophthalmic suspension Use 1 Drop in both eyes three times a day.    Continue:  Systane Complete solution instill 1 drop 3 times daily Both Eyes.    3. Glaucoma suspect of both eyes - ICD9: 365.00, ICD10: H40.003  4. Optic cupping of both eyes - ICD9: 377.14, ICD10: H47.233    Family history of glaucoma- mother  Glaucoma suspect based on optic cupping    The nature of glaucoma was discussed, with emphasis on the non-reversible damage to the optic nerve. Treatment options and the importance of regular examinations and testing were covered in detail, as well as the consequences of non-compliance. The patient was given the opportunity to ask questions.    5. Sleep apnea, unspecified type - ICD9: 780.57, ICD10: G47.30  6. Mixed hyperlipidemia - ICD9: 272.2, ICD10: E78.2    Continue care with primary care physician    I have confirmed and edited as necessary the relevant HPI, ophthalmic history, ROS, and the neuro exam findings as obtained by others. I have seen and examined Caron Marc.  I have discussed the case and the management of this patient's care with the Resident/Fellow, if applicable. I also have reviewed and agree with the assessment and plan as stated above and agree with all of its relevant components.    Electronically signed by Grant Urban MD at 02/28/2024 11:06 AM EST   Plan of Treatment  - documented  as of this encounter  Plan of Treatment - Upcoming Encounters  Upcoming Encounters  Date Type Department Care Team (Latest Contact Info) Description   03/07/2024 11:00 AM EST Hospital Encounter   Grant Urban MD   [address] [place] [address]   [phone] (Work)   [phone] (Fax)  Combined form of age-related cataract, left eye [H25.812]   03/07/2024 11:00 AM EST - 03/07/2024 11:15 AM EST Surgery [address] [address]  Grant Urban MD   [address] [place] [address]   [phone] (Work)   [phone] (Fax)  [facility]   03/08/2024 9:00 AM EST Office Visit [facility] Ophthalmology   [address] [place] [address]   [phone]  Grant Urban MD   [address] [place] [address]   [phone] (Work)   [phone] (Fax)  1 DAY PO 1ST LE     Plan of Treatment - Scheduled Procedures  Scheduled Procedures  Name Priority Associated Diagnoses Date/Time   SURGERY AT [facility]   Combined form of age-related cataract, left eye  03/07/2024 11:00 AM EST     Procedures  - documented in this encounter  Procedures  Procedure Name Priority Date/Time Associated Diagnosis Comments   ASCAN ONLY - DIAGNOSTIC OU (BOTH EYES) Routine 02/28/2024 11:06 AM EST Combined form of age-related cataract, left eye   Combined form of age-related cataract, right eye  Results for this procedure are in the results section.      Imaging Results  - documented in this encounter  ASCAN ONLY - DIAGNOSTIC OU (BOTH EYES) (02/28/2024 11:06 AM EST)  Imaging Results - ASCAN ONLY - DIAGNOSTIC OU (BOTH EYES) (02/28/2024 11:06 AM EST)  Anatomical Region Laterality Modality       Other     Imaging Results - ASCAN ONLY - DIAGNOSTIC OU (BOTH EYES) (02/28/2024 11:06 AM EST)  Narrative   02/28/2024 11:06 AM EST   Date of Procedure  2/28/2024.       Imaging Results - ASCAN ONLY - DIAGNOSTIC OU (BOTH EYES) (02/28/2024 11:06 AM EST)  Authorizing Provider Result Type    Grant Urban MD OPHTHALMOLOGY      Visit Diagnoses  - documented in this encounter  Visit Diagnoses  Diagnosis   Combined form of age-related cataract, left eye - Primary    Combined form of age-related cataract, right eye    Glaucoma suspect of both eyes   Preglaucoma, unspecified    Optic cupping of both eyes    Sleep apnea, unspecified type    Essential hypertension   Unspecified essential hypertension    Mixed hyperlipidemia    Combined form of age-related cataract, left eye      Discontinued Medications  - documented as of this encounter  Discontinued Medications  Medication Sig Discontinue Reason Start Date End Date   BIOTIN ORAL  Take 5,000 mg by mouth once daily. Discontinued by another Health Care Provider   02/28/2024   Lancets lancets  To check blood sugar fasting, and 2 hours after a meal Discontinued by another Health Care Provider 11/12/2021 02/28/2024   blood sugar diagnostic (BLOOD GLUCOSE TEST) test strip  To check blood sugar fasting, and 2 hours after a meal Discontinued by another Health Care Provider 11/12/2021 02/28/2024     Historical Medications  - added in this encounterReconcile with Patient's Chart  This list may reflect changes made after this encounter.    Historical Medications  Medication Sig Dispensed Refills Start Date End Date   oxyCODONE-acetaminophen (PERCOCET) 5-325 mg tablet      0 02/26/2024       Eye Exam    Eye Exam - Visual Acuity (Snellen - Linear)  Visual Acuity (Snellen - Linear)    Right eye Left eye   Dist cc 20/40 20/40 -1     Eye Exam  Correction: Glasses     Eye Exam - Tonometry (Applanation, 11:06 AM)  Tonometry (Applanation, 11:06 AM)    Right eye Left eye   Pressure 21 21     Eye Exam - Pupils  Pupils    Pupils Dark Light Shape React APD   Right eye PERRL 4 2 Round Brisk None   Left eye PERRL 4 2 Round Brisk None     Eye Exam - Visual Fields (Counting fingers)  Visual Fields (Counting fingers)    Right eye Left eye     Full Full     Eye Exam - Extraocular  Movement  Extraocular Movement    Right eye Left eye     Full Full     Eye Exam - Neuro/Psych  Neuro/Psych  Oriented x3: Yes   Mood/Affect: Normal     Eye Exam - Glare Testing  Glare Testing    Medium High   Right eye 20/80 20/100   Left eye 20/80 20/100      LENSTAR  Right eye: AL 23.21 ACD 3.16 WTW 12.27  Left eye: AL 23.02 ACD 3.16 WTW 12.18       Eye Exam - External Exam  External Exam    Right eye Left eye   External Normal including orbits and preauricular lymph nodes Normal including orbits and preauricular lymph nodes     Eye Exam - Slit Lamp Exam  Slit Lamp Exam    Right eye Left eye   Lids/Lashes Cystic lesion lower lid 2 mm in size (dark brown) Normal lids, lashes, lacrimal glands, and lacrimal drainage   Conjunctiva/Sclera White and quiet White and quiet   Cornea Normal epithelium, stroma, endothelium, and tear film Normal epithelium, stroma, endothelium, and tear film   Anterior Chamber Deep and quiet Deep and quiet   Iris Round and reactive Round and reactive   Lens 3+ Nuclear sclerotic cataract, 3+ Posterior subcapsular cataract 3+ Nuclear sclerotic cataract, 3+ Posterior subcapsular cataract   Anterior Vitreous Normal Normal     Eye Exam - Fundus Exam  Fundus Exam    Right eye Left eye   Disc Normal Normal   C/D Ratio 0.55 0.55   Macula Normal Normal   Vessels Normal Normal   Periphery Drusens mid-periphery Drusens mid-periphery     Eye Exam - Wearing Rx  Wearing Rx    Sphere Cylinder Axis Add   Right eye -0.25 +0.75 143 +2.75   Left eye +0.25 0..50 057 +2.75     Eye Exam  Type: PAL

## 2024-03-07 NOTE — ANESTHESIA PREPROCEDURE EVALUATION
Patient: Caron Marc    Procedure Information       Date/Time: 03/07/24 1140    Procedure: Phacoemulsification Cataract with Insertion Intraocular Lens (Left: Eye)    Location: [facility] OR  / [facility]    Surgeons: Grant Urban MD            Relevant Problems   Cardiovascular   (+) Hypertension   (+) Mixed hyperlipidemia      Endocrine   (+) Secondary hypothyroidism      /Renal   (+) Fatty liver      Neuro/Psych   (+) Anxiety with depression      Pulmonary   (+) Mild intermittent asthma without complication      GI/Hepatic   (+) Fatty liver       Clinical information reviewed:   Tobacco  Allergies  Meds  Problems  Med Hx  Surg Hx   Fam Hx  Soc   Hx        NPO Detail:  NPO/Void Status  Carbohydrate Drink Given Prior to Surgery? : N  Date of Last Liquid: 03/06/24  Time of Last Liquid: 1900  Date of Last Solid: 03/06/24  Time of Last Solid: 1900  Last Intake Type: Clear fluids  Time of Last Void: 0900         Physical Exam    Airway  Mallampati: II  TM distance: >3 FB  Neck ROM: full     Cardiovascular   Rhythm: regular  Rate: normal     Dental - normal exam     Pulmonary   Breath sounds clear to auscultation     Abdominal            Anesthesia Plan    History of general anesthesia?: yes  History of complications of general anesthesia?: no    ASA 2     MAC     The patient is not a current smoker.  Patient was not previously instructed to abstain from smoking on day of procedure.  Patient did not smoke on day of procedure.    intravenous induction   Anesthetic plan and risks discussed with patient.

## 2024-03-07 NOTE — DISCHARGE INSTRUCTIONS
Please see enclosed instructions from Dr. Urban regarding eye drop schedule, restrictions and use of eye shield.    Please take bag with eye drops that were given to you today as well as ALL eye drops that you are using at home with you to your appointment tomorrow at Dr. Urban's office.     Please see enclosed sedation information     Individuals who cared for you today:   JOSE DAVID Mancilla, JOSE DAVID Gonzalez, JOSE DAVID Kelley (anesthesia)

## 2024-03-14 ENCOUNTER — PREP FOR PROCEDURE (OUTPATIENT)
Dept: OPERATING ROOM | Facility: HOSPITAL | Age: 69
End: 2024-03-14
Payer: MEDICARE

## 2024-03-14 DIAGNOSIS — H25.811 COMBINED FORMS OF AGE-RELATED CATARACT OF RIGHT EYE: Primary | ICD-10-CM

## 2024-03-14 RX ORDER — TETRACAINE HYDROCHLORIDE 5 MG/ML
1 SOLUTION OPHTHALMIC ONCE
Status: CANCELLED | OUTPATIENT
Start: 2024-03-21 | End: 2024-03-14

## 2024-03-14 RX ORDER — POVIDONE-IODINE 5 %
SOLUTION, NON-ORAL OPHTHALMIC (EYE) ONCE
Status: CANCELLED | OUTPATIENT
Start: 2024-03-21 | End: 2024-03-14

## 2024-03-14 RX ORDER — PREDNISOLONE ACETATE 10 MG/ML
1 SUSPENSION/ DROPS OPHTHALMIC ONCE
Status: CANCELLED | OUTPATIENT
Start: 2024-03-21 | End: 2024-03-14

## 2024-03-14 RX ORDER — KETOROLAC TROMETHAMINE 5 MG/ML
1 SOLUTION OPHTHALMIC
Status: CANCELLED | OUTPATIENT
Start: 2024-03-21 | End: 2024-03-21

## 2024-03-20 ENCOUNTER — ANESTHESIA EVENT (OUTPATIENT)
Dept: OPERATING ROOM | Facility: HOSPITAL | Age: 69
End: 2024-03-20
Payer: MEDICARE

## 2024-03-21 ENCOUNTER — HOSPITAL ENCOUNTER (OUTPATIENT)
Facility: HOSPITAL | Age: 69
Setting detail: OUTPATIENT SURGERY
Discharge: HOME | End: 2024-03-21
Attending: OPHTHALMOLOGY | Admitting: OPHTHALMOLOGY
Payer: MEDICARE

## 2024-03-21 ENCOUNTER — ANESTHESIA (OUTPATIENT)
Dept: OPERATING ROOM | Facility: HOSPITAL | Age: 69
End: 2024-03-21
Payer: MEDICARE

## 2024-03-21 VITALS
WEIGHT: 156.97 LBS | OXYGEN SATURATION: 97 % | DIASTOLIC BLOOD PRESSURE: 82 MMHG | RESPIRATION RATE: 12 BRPM | TEMPERATURE: 98.9 F | BODY MASS INDEX: 27.81 KG/M2 | HEIGHT: 63 IN | HEART RATE: 86 BPM | SYSTOLIC BLOOD PRESSURE: 115 MMHG

## 2024-03-21 PROCEDURE — 7100000009 HC PHASE TWO TIME - INITIAL BASE CHARGE: Performed by: OPHTHALMOLOGY

## 2024-03-21 PROCEDURE — 3600000008 HC OR TIME - EACH INCREMENTAL 1 MINUTE - PROCEDURE LEVEL THREE: Performed by: OPHTHALMOLOGY

## 2024-03-21 PROCEDURE — 7100000010 HC PHASE TWO TIME - EACH INCREMENTAL 1 MINUTE: Performed by: OPHTHALMOLOGY

## 2024-03-21 PROCEDURE — C1780 LENS, INTRAOCULAR (NEW TECH): HCPCS | Performed by: OPHTHALMOLOGY

## 2024-03-21 PROCEDURE — 2500000001 HC RX 250 WO HCPCS SELF ADMINISTERED DRUGS (ALT 637 FOR MEDICARE OP): Performed by: OPHTHALMOLOGY

## 2024-03-21 PROCEDURE — 2760000001 HC OR 276 NO HCPCS: Performed by: OPHTHALMOLOGY

## 2024-03-21 PROCEDURE — 3600000003 HC OR TIME - INITIAL BASE CHARGE - PROCEDURE LEVEL THREE: Performed by: OPHTHALMOLOGY

## 2024-03-21 PROCEDURE — 3700000001 HC GENERAL ANESTHESIA TIME - INITIAL BASE CHARGE: Performed by: OPHTHALMOLOGY

## 2024-03-21 PROCEDURE — 2500000005 HC RX 250 GENERAL PHARMACY W/O HCPCS: Performed by: OPHTHALMOLOGY

## 2024-03-21 PROCEDURE — 3700000002 HC GENERAL ANESTHESIA TIME - EACH INCREMENTAL 1 MINUTE: Performed by: OPHTHALMOLOGY

## 2024-03-21 PROCEDURE — 2500000004 HC RX 250 GENERAL PHARMACY W/ HCPCS (ALT 636 FOR OP/ED): Performed by: ANESTHESIOLOGY

## 2024-03-21 PROCEDURE — 2500000004 HC RX 250 GENERAL PHARMACY W/ HCPCS (ALT 636 FOR OP/ED): Performed by: OPHTHALMOLOGY

## 2024-03-21 PROCEDURE — 2720000007 HC OR 272 NO HCPCS: Performed by: OPHTHALMOLOGY

## 2024-03-21 DEVICE — LENS, INTRAOCULAR, SN60WF 20.0 DIOP: Type: IMPLANTABLE DEVICE | Site: EYE | Status: FUNCTIONAL

## 2024-03-21 RX ORDER — POVIDONE-IODINE 5 %
SOLUTION, NON-ORAL OPHTHALMIC (EYE) ONCE
Status: COMPLETED | OUTPATIENT
Start: 2024-03-21 | End: 2024-03-21

## 2024-03-21 RX ORDER — ONDANSETRON HYDROCHLORIDE 2 MG/ML
4 INJECTION, SOLUTION INTRAVENOUS ONCE
Status: COMPLETED | OUTPATIENT
Start: 2024-03-21 | End: 2024-03-21

## 2024-03-21 RX ORDER — PREDNISOLONE ACETATE 10 MG/ML
1 SUSPENSION/ DROPS OPHTHALMIC ONCE
Status: DISCONTINUED | OUTPATIENT
Start: 2024-03-21 | End: 2024-03-21 | Stop reason: HOSPADM

## 2024-03-21 RX ORDER — KETOROLAC TROMETHAMINE 5 MG/ML
1 SOLUTION OPHTHALMIC
Status: COMPLETED | OUTPATIENT
Start: 2024-03-21 | End: 2024-03-21

## 2024-03-21 RX ORDER — MIDAZOLAM HYDROCHLORIDE 1 MG/ML
2 INJECTION INTRAMUSCULAR; INTRAVENOUS ONCE
Status: COMPLETED | OUTPATIENT
Start: 2024-03-21 | End: 2024-03-21

## 2024-03-21 RX ORDER — TETRACAINE HYDROCHLORIDE 5 MG/ML
1 SOLUTION OPHTHALMIC ONCE
Status: COMPLETED | OUTPATIENT
Start: 2024-03-21 | End: 2024-03-21

## 2024-03-21 RX ORDER — SODIUM CHLORIDE, SODIUM LACTATE, POTASSIUM CHLORIDE, CALCIUM CHLORIDE 600; 310; 30; 20 MG/100ML; MG/100ML; MG/100ML; MG/100ML
50 INJECTION, SOLUTION INTRAVENOUS CONTINUOUS
Status: DISCONTINUED | OUTPATIENT
Start: 2024-03-21 | End: 2024-03-21 | Stop reason: HOSPADM

## 2024-03-21 RX ORDER — FENTANYL CITRATE 50 UG/ML
INJECTION, SOLUTION INTRAMUSCULAR; INTRAVENOUS AS NEEDED
Status: DISCONTINUED | OUTPATIENT
Start: 2024-03-21 | End: 2024-03-21

## 2024-03-21 RX ORDER — MIDAZOLAM HYDROCHLORIDE 1 MG/ML
INJECTION INTRAMUSCULAR; INTRAVENOUS AS NEEDED
Status: DISCONTINUED | OUTPATIENT
Start: 2024-03-21 | End: 2024-03-21

## 2024-03-21 RX ADMIN — MIDAZOLAM HYDROCHLORIDE 1 MG: 1 INJECTION, SOLUTION INTRAMUSCULAR; INTRAVENOUS at 09:03

## 2024-03-21 RX ADMIN — KETOROLAC TROMETHAMINE 1 DROP: 5 SOLUTION OPHTHALMIC at 07:53

## 2024-03-21 RX ADMIN — POVIDONE-IODINE: 5 SOLUTION OPHTHALMIC at 07:42

## 2024-03-21 RX ADMIN — KETOROLAC TROMETHAMINE 1 DROP: 5 SOLUTION OPHTHALMIC at 07:43

## 2024-03-21 RX ADMIN — ONDANSETRON 4 MG: 2 INJECTION INTRAMUSCULAR; INTRAVENOUS at 08:02

## 2024-03-21 RX ADMIN — KETOROLAC TROMETHAMINE 1 DROP: 5 SOLUTION OPHTHALMIC at 08:02

## 2024-03-21 RX ADMIN — PHENYLEPHRINE HYDROCHLORIDE 1 DROP: 100 SOLUTION/ DROPS OPHTHALMIC at 07:54

## 2024-03-21 RX ADMIN — POLYVINYL ALCOHOL, POVIDONE 1 DROP: 14; 6 SOLUTION/ DROPS OPHTHALMIC at 07:49

## 2024-03-21 RX ADMIN — PHENYLEPHRINE HYDROCHLORIDE 1 DROP: 100 SOLUTION/ DROPS OPHTHALMIC at 07:42

## 2024-03-21 RX ADMIN — POLYVINYL ALCOHOL, POVIDONE 1 DROP: 14; 6 SOLUTION/ DROPS OPHTHALMIC at 08:02

## 2024-03-21 RX ADMIN — PHENYLEPHRINE HYDROCHLORIDE 1 DROP: 100 SOLUTION/ DROPS OPHTHALMIC at 08:02

## 2024-03-21 RX ADMIN — POLYVINYL ALCOHOL, POVIDONE 1 DROP: 14; 6 SOLUTION/ DROPS OPHTHALMIC at 07:43

## 2024-03-21 RX ADMIN — SODIUM CHLORIDE, POTASSIUM CHLORIDE, SODIUM LACTATE AND CALCIUM CHLORIDE 50 ML/HR: 600; 310; 30; 20 INJECTION, SOLUTION INTRAVENOUS at 07:43

## 2024-03-21 RX ADMIN — PHENYLEPHRINE HYDROCHLORIDE 1 DROP: 100 SOLUTION/ DROPS OPHTHALMIC at 07:49

## 2024-03-21 RX ADMIN — POLYVINYL ALCOHOL, POVIDONE 1 DROP: 14; 6 SOLUTION/ DROPS OPHTHALMIC at 07:54

## 2024-03-21 RX ADMIN — MIDAZOLAM HYDROCHLORIDE 0.5 MG: 1 INJECTION, SOLUTION INTRAMUSCULAR; INTRAVENOUS at 09:08

## 2024-03-21 RX ADMIN — FENTANYL CITRATE 50 MCG: 50 INJECTION, SOLUTION INTRAMUSCULAR; INTRAVENOUS at 09:03

## 2024-03-21 RX ADMIN — TETRACAINE HYDROCHLORIDE 1 DROP: 5 SOLUTION OPHTHALMIC at 07:42

## 2024-03-21 RX ADMIN — FENTANYL CITRATE 25 MCG: 50 INJECTION, SOLUTION INTRAMUSCULAR; INTRAVENOUS at 09:14

## 2024-03-21 RX ADMIN — KETOROLAC TROMETHAMINE 1 DROP: 5 SOLUTION OPHTHALMIC at 07:49

## 2024-03-21 RX ADMIN — MIDAZOLAM HYDROCHLORIDE 0.5 MG: 1 INJECTION, SOLUTION INTRAMUSCULAR; INTRAVENOUS at 09:13

## 2024-03-21 RX ADMIN — FENTANYL CITRATE 25 MCG: 50 INJECTION, SOLUTION INTRAMUSCULAR; INTRAVENOUS at 09:09

## 2024-03-21 RX ADMIN — MIDAZOLAM HYDROCHLORIDE 2 MG: 1 INJECTION, SOLUTION INTRAMUSCULAR; INTRAVENOUS at 08:19

## 2024-03-21 SDOH — HEALTH STABILITY: MENTAL HEALTH: CURRENT SMOKER: 0

## 2024-03-21 ASSESSMENT — PAIN SCALES - GENERAL
PAINLEVEL_OUTOF10: 3
PAINLEVEL_OUTOF10: 0 - NO PAIN
PAIN_LEVEL: 0

## 2024-03-21 ASSESSMENT — PAIN - FUNCTIONAL ASSESSMENT: PAIN_FUNCTIONAL_ASSESSMENT: 0-10

## 2024-03-21 NOTE — ANESTHESIA PREPROCEDURE EVALUATION
Patient: Caron Marc    Procedure Information       Date/Time: 03/21/24 0850    Procedure: Phacoemulsification Cataract with Insertion Intraocular Lens (Right: Eye)    Location: [facility] OR  / [facility] OR    Surgeons: Grant Urban MD            Relevant Problems   Anesthesia   (-) Difficult intubation   (-) PONV (postoperative nausea and vomiting)      Cardiovascular   (+) Hypertension   (+) Mixed hyperlipidemia      Endocrine   (+) Secondary hypothyroidism      /Renal   (+) Fatty liver      Neuro/Psych   (+) Anxiety with depression      Pulmonary   (+) Mild intermittent asthma without complication      GI/Hepatic   (+) Fatty liver       Clinical information reviewed:   Tobacco  Allergies  Meds   Med Hx  Surg Hx   Fam Hx  Soc Hx        NPO Detail:  No data recorded     Physical Exam    Airway  Mallampati: II  TM distance: >3 FB  Neck ROM: full     Cardiovascular   Rhythm: regular  Rate: normal     Dental   Comments: Intact   Pulmonary   Breath sounds clear to auscultation     Abdominal            Anesthesia Plan    History of general anesthesia?: yes  History of complications of general anesthesia?: no    ASA 2     MAC     The patient is not a current smoker.    intravenous induction   Anesthetic plan and risks discussed with patient.    MAC discussed with Patient.  Plan and process discussed for anesthesia for procedure.  Risks and benefits discussed.  Need for cooperation with the surgeon for possible block per surgeon as well as the procedure. All questions answered with patient.  The patient understands plan and wishes to proceed.

## 2024-03-21 NOTE — H&P
H&P Notes  - documented in this encounter  Grant Urban MD - 2024 10:51 AM EDT  Formatting of this note is different from the original.  HISTORY AND PHYSICAL EXAMINATION    SERVICE DATE: 3/18/2024  SERVICE TIME: 10:52 AM    PRIMARY CARE PHYSICIAN: Eleanor Downs DO    REASON FOR VISIT:  Caron Marc is a 68 year old female who is being seen for Combined form Age-Related Cataract right eye    The patient has the following:  ACTIVE PROBLEM LIST  Hypothyroidism  Hypertension  Thyroid Nodule  Depression  Impaired Fasting Glucose  Osteopenia  Mixed Hyperlipidemia  Sleep Apnea  Obesity, Class I, Bmi 30-34.9  Abnormal Liver Function Tests  Osteoporosis, Unspecified    SUBJECTIVE  CHIEF COMPLAINT: Combined form Age-Related cataract right eye  HPI: Blurred vision, glare, halos, difficulty reading small and large print, trouble seeing road signs during the day and at night.    PAST MEDICAL HISTORY  Diagnosis Date  Arthritis  CMC arthritis 2014  Depression  Hypercholesteremia  Hypertension  Hypothyroidism  Obesity 2018  Right homonymous hemianopsia 2016  Sleep apnea  Visual field loss 2016  Vitreous floaters of both eyes 2016    PAST SURGICAL HISTORY  Procedure Laterality Date  HYSTERECTOMY HX  REMV CATARACT EXTRACAP,INSERT LENS Left 2024  +21.0    FAMILY HISTORY  Problem Relation Age of Onset  No Ocular Disease Father  Glaucoma Mother  other (thyroid cancer[other]) Daughter    SOCIAL HISTORY:  Social History    Tobacco Use  Smoking status: Former  Packs/day: 1.00  Years: 20.00  Additional pack years: 0.00  Total pack years: 20.00  Types: Cigarettes  Quit date: 2003  Years since quittin.1  Smokeless tobacco: Never  Substance Use Topics  Alcohol use: Yes  Alcohol/week: 7.0 standard drinks of alcohol  Types: 7 Glasses of Wine (5oz) per week  Drug use: No    MEDICATIONS:  Prior to Admission medications as of 3/18/24 1043  Medication Sig Last Dose  Taking  erythromycin (ROMYCIN) 5 mg/gram (0.5 %) ophthalmic ointment Use 1 application in the right eye daily at bedtime. Taking Yes  oxyCODONE-acetaminophen (PERCOCET) 5-325 mg tablet Taking Yes  lisinopril-hydroCHLOROthiazide (ZESTORETIC) 10-12.5 mg per tablet Taking Yes  metoprolol succinate ER (TOPROL XL) 25 mg 24 hr tablet Taking Yes  montelukast (SINGULAIR) 10 mg tablet Taking Yes  traZODone (DESYREL) 100 mg tablet Taking Yes  levothyroxine (LEVOXYL) 88 mcg tablet Take 1 tablet by mouth once daily. Taking Yes  Potassium 99 mg tab Take 1 tablet by mouth once daily. Taking Yes  atorvastatin (LIPITOR) 40 mg tablet Take 1 tablet by mouth every other day. Taking Yes  lisinopril (ZESTRIL, PRINIVIL) 20 mg tablet Take 1 tablet by mouth once daily. Taking Yes  CPAP Taking Yes  venlafaxine (EFFEXOR) 75 mg tablet Take 225 mg by mouth once daily. Taking Yes  MULTIVIT WITH CALCIUM,IRON,MIN (WOMEN'S MULTIPLE VITAMINS ORAL) Take by mouth.  Taking Yes  aspirin, enteric coated (ASPIRIN, ENTERIC COATED) 81 mg EC tablet Take 1 tablet by mouth every other day. Taking Yes  keTORolac (ACULAR) 0.5 % ophthalmic solution Use 1 Drop in the left eye three times a day for 24 days.  fluorometholone (FML LIQUID FILM) 0.1 % ophthalmic suspension Use 1 Drop in the right eye three times a day for 3 days.  prednisoLONE acetate (PRED FORTE) 1 % ophthalmic suspension Use 1 Drop in the left eye three times a day for 24 days.    No medication comments found.    CURRENT ALLERGIES:  ALLERGIES  Allergen Reactions  Alendronate Other: See Comments  Chills, aches and pains.  Codeine Other: See Comments  Ringing in ears    REVIEW OF SYSTEMS:  PAIN ASSESSMENT:  General: No weight loss, malaise or fevers.  Neuro: See HPI  Respiratory: No history of current cough or dyspnea, or pneumonia in the past 6 weeks. No history of respiratory/pulmonary symptoms or problems  Cardiovascular: Positive for: Hypertension  GI: No history of GI symptoms or problems. No  history of esophageal varices, recent ascites, or ETOH greater than 2 drinks per day.  : No history of UTI in past 6 weeks. No history of renal failure. Not currently on or requiring dialysis. No history of  symptoms or problems.  GYN: Negative for abnormal vaginal bleeding, abnormal vaginal discharge.  Pregnancy: N/A  Endocrine: No history of diabetes. Has not taken steroids within the past 30 days. No history of endocrinological symptoms or problems.  Hematology: No history of bleeding or clotting disorder. Pt is not taking anti-coagulation or platelet medications. No history of hematological symptoms or problems.  Oncology: No history of CA metastasis, chemo within 30 days, or radiotherapy within 90 days. Has not lost 10% of body wt in 6 months. No history of oncological symptoms or problems.  Psych: No history of psychiatric symptoms or problems.  Musculoskeletal: Negative for joint pain or swelling, back pain or muscle pain.  Skin: Negative for lesions, rash and itching.    PHYSICAL EXAM:  VITALS:  /64  Pulse 80        General: Alert and oriented  Skin: Normal color, no rash, no lesions.  HEENT: EOM, pupils equal, round and reactive.  Cardiovascular: Normal S1 & S2, no rubs, murmurs or gallops. No JVD. Pulse regular.  Lungs: Normal breath sounds, no wheezes or crackles.  Abdomen: Soft, non-tender, no rigidity.  Extremities: No deformity, no edema or tenderness, no joint swelling or clubbing.  Neurological: Normal cognition and motor skills.  Pulses: Carotid and radial pulses normal +2.    Diagnostic tests reviewed for today's visit:  N/A    ASSESSMENT  Medication and Non-Pharmacologic VTE Prophylaxis/Anticoagulants      VTE Prophylaxis: N/A    Impression: There is no known pertinent medical condition which may affect manny-operative course    Clinical Risk Factors for Possible Cardiac Complications:  None    Patient is scheduled for a low-risk procedure.    FUNCTIONAL STATUS: Walk indoors, such as  around the house (1.75 METs)    Functional Class (NYHA): N/A    [employer]: N/A    PLAN  CONSULTS:  Patient does not require consults for optimization at this time.    The Following Tests/Procedures Have Been Initiated:  None    Instructions Given to Patient:  Patient given verbal and written preop instructions and voices comprehension and compliance.    SIGNATURE: Grant Urban MD PATIENT NAME: Caron Marc  DATE: March 18, 2024 MRN: [id number]  TIME: 10:52 AM PAGER/CONTACT #:    Electronically signed by Grant Urban MD at 03/18/2024 11:00 AM EDT   Source Comments  - [facility]  In the event this information is protected by the Federal Confidentiality of Alcohol and Drug Abuse Patient Records regulations: This information has been disclosed to you from records protected by Federal confidentiality rules (42 CFR Part 2). The Federal rules prohibit you from making any further disclosure of this information unless further disclosure is expressly permitted by the written consent of the person to whom it pertains or as otherwise permitted by 42 CFR Part 2. A general authorization for the release of medical or other information is NOT sufficient for this purpose. The Federal rules restrict any use of the information to criminally investigate or prosecute any alcohol or drug abuse patient.  Reason for Visit    Reason for Visit -  Reason Comments   Blurred Vision Right Eye     Difficulty Reading Right Eye     Glare Right eye     Encounter Details    Encounter Details  Date Type Department Care Team (Latest Contact Info) Description   03/18/2024 10:45 AM EDT Office Visit [facility] Ophthalmology   [address] [place] [id number]   [phone]  Grant rUban MD   [address] [place] [address]   [phone] (Work)   [phone] (Fax)  Combined form of age-related cataract, right eye (Primary Dx);  Status post cataract extraction and insertion of intraocular lens of left eye;  Glaucoma suspect of  both eyes;  Optic cupping of both eyes;  Sleep apnea, unspecified type;  Essential hypertension;  Mixed hyperlipidemia     Social History  - documented as of this encounter  Social History  Tobacco Use Types Packs/Day Years Used Date   Smoking Tobacco: Former Cigarettes 1 20 Quit: 02/02/2003   Smokeless Tobacco: Never             Social History  Alcohol Use Standard Drinks/Week Comments   Yes 7 (1 standard drink = 0.6 oz pure alcohol)       Social History  Area Deprivation Index Answer Date Recorded   National Score (1-100), lower number is lower risk 45 09/19/2023   State Score (1-10), lower number is lower risk 2 09/19/2023   Data from: [email or web address].[email or web address]/. Last address used for calculation [address] 09/19/2023     Social History  Sex and Gender Information Value Date Recorded   Sex Assigned at Birth Not on file     Gender Identity Not on file     Sexual Orientation Not on file       Social History  Job Start Date Occupation Industry   Not on file Not on file Not on file     Last Filed Vital Signs  - documented in this encounter  Last Filed Vital Signs  Vital Sign Reading Time Taken Comments   Blood Pressure 101/64 03/18/2024 10:35 AM EDT     Pulse 80 03/18/2024 10:35 AM EDT     Temperature - -     Respiratory Rate - -     Oxygen Saturation - -     Inhaled Oxygen Concentration - -     Weight - -     Height - -     Body Mass Index - -       Functional Status  - documented as of this encounter  Functional Status  Functional Status Response Date of Assessment   Are you deaf or do you have serious difficulty hearing? No 04/13/2015   Are you blind or do you have serious difficulty seeing, even when wearing glasses? No 04/13/2015   Do you have serious difficulty walking or climbing stairs? No 04/13/2015   Do you have difficulty dressing or bathing? No 04/13/2015   Because of a physical, mental, or emotional condition, do you have difficulty doing errands alone such as visiting a  doctor's office or shopping? No 04/13/2015     Functional Status  Cognitive Status Response Date of Assessment   Because of a physical, mental, or emotional condition, do you have serious difficulty concentrating, remembering, or making decisions? No 04/13/2015     Patient Instructions  - documented in this encounter  Patient Instructions  Grant Urban MD - 03/18/2024 10:50 AM EDT   Formatting of this note is different from the original.  Current Ophthalmic Meds        erythromycin (ROMYCIN) 5 mg/gram (0.5 %) ophthalmic ointment  Use 1 application in the right eye daily at bedtime.  keTORolac (ACULAR) 0.5 % ophthalmic solution  Use 1 Drop in the left eye three times a day for 24 days.  fluorometholone (FML LIQUID FILM) 0.1 % ophthalmic suspension Use 1 Drop in the right eye three times a day for 3 days.  prednisoLONE acetate (PRED FORTE) 1 % ophthalmic suspension Use 1 Drop in the left eye three times a day for 24 days.    Systane Complete Artificial Tears - Use 1 Drop into both eyes three times a day.    If you have any questions please contact our office at [phone].  After office hours or on the weekend, please call Dr. Urban on his cell phone at [phone].    Electronically signed by Grant Urban MD at 03/18/2024 10:50 AM EDT     Ordered Prescriptions  - documented in this encounterReconcile with Patient's Chart  Ordered Prescriptions  Prescription Sig Dispensed Refills Start Date End Date   prednisoLONE acetate (PRED FORTE) 1 % ophthalmic suspension  Use 1 Drop in the left eye three times a day for 24 days. 5 mL  1 03/18/2024 04/11/2024   fluorometholone (FML LIQUID FILM) 0.1 % ophthalmic suspension  Use 1 Drop in the right eye three times a day for 3 days. 5 mL  2 03/18/2024 03/21/2024   keTORolac (ACULAR) 0.5 % ophthalmic solution  Use 1 Drop in the left eye three times a day for 24 days. 5 mL  1 03/18/2024 04/11/2024     Progress Notes  - documented in this encounter  Grant Urban MD  - 03/18/2024 10:44 AM EDT  Formatting of this note is different from the original.  ASSESSMENT/PLAN:  1. Combined form of age-related cataract, right eye - ICD9: 366.19, ICD10: H25.811 (primary diagnosis)    Upon eye examination, patient was found to have a visually significant cataract right eye. Discussed cataract surgery with patient and different intraocular lens implant options with patient: basic monofocal intraocular lens implant, Toric intraocular lens implant, and presbyopia correction intraocular lens implant. In my medical opinion, based on medical history and ocular examination, cataract surgery with intraocular lens implant will correct patient's vision and improve quality of patient's daily living activities. Patient wishes to have traditional cataract surgery with basic intraocular lens right eye scheduled on 3/21/24. Patient wishes to have cataract surgery with the option stated above. Patient understands that an intraocular lens implant does not necessarily replace the need for glasses. Patient understands that it is impossible for the surgeon to inform him/her of every possible complication that may occur. The surgeon has answered all of the patient's questions. Patient understands that if he/she has a mature or dense cataract, pseudoexfoliation cataract, or history of use of Flomax, he/she may require the use of Maluyugin Ring and/or Vision Blue during surgery. Patient understands the risks, benefits, and alternatives to surgery.    Current Ophthalmic Meds        erythromycin (ROMYCIN) 5 mg/gram (0.5 %) ophthalmic ointment Use 1 application in the right eye daily at bedtime.  fluorometholone (FML LIQUID FILM) 0.1 % ophthalmic suspension Use 1 Drop in the right eye three times a day for 3 days.    Cataract Presurgical Documentation    Cataract: Right eye (OD)    Current Visual Acuity  Right Eye Distance CC 20/40      Glare Testing:  Right Eye Medium 20/80  Right Eye High 20/100    Visual Function:  Caron Marc states that the decline in vision from the cataract impedes her abilities as listed in the HPI, as well as other activities of daily living.    Caron Marc has confirmed that she is no longer able to function adequately on a day-to-day basis because of her current visual condition.    Further, it is my medical opinion that the cataract is the primary cause, or at least a significantly contributory cause of her visual dysfunction. With uncomplicated cataract surgery and lens implantation, it is my expectation that her visual function and quality of life will improve, significantly.    The risks, benefits, alternatives, personnel and complications of cataract surgery with lens implantation were discussed with Caron Marc in detail. she appeared to understand and asked that I proceed with plans for surgery.    PHYSICAL EXAM:    Vital Signs:  Blood pressure 101/64, pulse 80.    Respiratory:  Normal breath sounds, no wheezing.    CARD:  Normal heart sounds 1 & 2, normal sinus rhythm.    2. Status post cataract extraction and insertion of intraocular lens of left eye - ICD9: V45.61, V43.1, ICD10: Z98.42, Z96.1  - Continue  Current Ophthalmic Meds        keTORolac (ACULAR) 0.5 % ophthalmic solution  Use 1 Drop in the left eye three times a day for 24 days.  prednisoLONE acetate (PRED FORTE) 1 % ophthalmic suspension Use 1 Drop in the left eye three times a day for 24 days.    Systane Complete Artificial Tears - Use 1 Drop into both eyes three times a day.    3. Glaucoma suspect of both eyes - ICD9: 365.00, ICD10: H40.003  4. Optic cupping of both eyes - ICD9: 377.14, ICD10: H47.233  - Family history of glaucoma- mother  - Glaucoma suspect based on optic cupping    The nature of glaucoma was discussed, with emphasis on the non-reversible damage to the optic nerve. Treatment options and the importance of regular examinations and testing were covered in detail, as well as the consequences of  non-compliance. The patient was given the opportunity to ask questions.    - Monitor    5. Sleep apnea, unspecified type - ICD9: 780.57, ICD10: G47.30  6. Essential hypertension - ICD9: 401.9, ICD10: I10  7. Mixed hyperlipidemia - ICD9: 272.2, ICD10: E78.2  - Continue to monitor with PCP    I have confirmed and edited as necessary the relevant HPI, ophthalmic history, ROS, and the neuro exam findings as obtained by others. I have seen and examined Caron Marc.  I have discussed the case and the management of this patient's care with the Resident/Fellow, if applicable. I also have reviewed and agree with the assessment and plan as stated above and agree with all of its relevant components.        Electronically signed by Grant Urban MD at 03/18/2024 11:00 AM EDT   Plan of Treatment  - documented as of this encounter  Plan of Treatment - Upcoming Encounters  Upcoming Encounters  Date Type Department Care Team (Latest Contact Info) Description   03/22/2024 11:30 AM EDT Office Visit [facility] Ophthalmology   [address] [place] [address]   [phone]  Grant Urban MD   [address] [place] [address]   [phone] (Work)   [phone] (Fax)  1 day po 2nd re basic     Plan of Treatment - Scheduled Procedures  Scheduled Procedures  Name Priority Associated Diagnoses Date/Time   SURGERY AT [facility] FAC[facility]   Combined form of age-related cataract, right eye  03/21/2024 11:15 AM EDT     Procedures  - documented in this encounter  Procedures  Procedure Name Priority Date/Time Associated Diagnosis Comments   IOL BIOMETRY W/ IOL CALC OD (RIGHT EYE) Routine 03/18/2024 10:43 AM EDT Combined form of age-related cataract, right eye  Results for this procedure are in the results section.      Imaging Results  - documented in this encounter  IOL BIOMETRY W/ IOL CALC OD (RIGHT EYE) (03/18/2024 10:43 AM EDT)  Imaging Results - IOL BIOMETRY W/ IOL CALC OD (RIGHT EYE) (03/18/2024 10:43 AM EDT)  Anatomical  Region Laterality Modality       Other     Imaging Results - IOL BIOMETRY W/ IOL CALC OD (RIGHT EYE) (03/18/2024 10:43 AM EDT)  Narrative   03/18/2024 10:43 AM EDT   Date of Procedure  3/18/2024.    Notes  Measurements only - see Procedure Record under Scanned Documents for  signed results.       Imaging Results - IOL BIOMETRY W/ IOL CALC OD (RIGHT EYE) (03/18/2024 10:43 AM EDT)  Authorizing Provider Result Type   Grant Urban MD OPHTHALMOLOGY     Associated Images       3/18/2024  IOL BIOMETRY W/ IOL CALC OD (RIGHT EYE)     Visit Diagnoses  - documented in this encounter  Visit Diagnoses  Diagnosis   Combined form of age-related cataract, right eye - Primary    Status post cataract extraction and insertion of intraocular lens of left eye    Glaucoma suspect of both eyes   Preglaucoma, unspecified    Optic cupping of both eyes    Sleep apnea, unspecified type    Essential hypertension   Unspecified essential hypertension    Mixed hyperlipidemia      Discontinued Medications  - documented as of this encounter  Discontinued Medications  Medication Sig Discontinue Reason Start Date End Date   prednisoLONE acetate (PRED FORTE) 1 % ophthalmic suspension  Use 1 Drop in the left eye four times daily.   03/08/2024 03/18/2024   keTORolac (ACULAR) 0.5 % ophthalmic solution  Use 1 Drop in the left eye four times daily.   03/08/2024 03/18/2024   fluorometholone (FML LIQUID FILM) 0.1 % ophthalmic suspension  Use 1 Drop in the right eye three times a day.   03/08/2024 03/18/2024     Eye Exam    Eye Exam - Visual Acuity (Snellen - Linear)  Visual Acuity (Snellen - Linear)    Right eye Left eye   Dist cc 20/40     Near cc J5       Eye Exam  Correction: Glasses     Eye Exam - Tonometry (Applanation, 10:55 AM)  Tonometry (Applanation, 10:55 AM)    Right eye Left eye   Pressure 16 16     Eye Exam - Pupils  Pupils    Pupils Dark Light Shape React APD   Right eye PERRL 4 2 Round +2 None   Left eye PERRL 4 2 Round +2 None     Eye  Exam - Visual Fields (Counting fingers)  Visual Fields (Counting fingers)    Right eye Left eye     Full Full     Eye Exam - Extraocular Movement  Extraocular Movement    Right eye Left eye     Full Full     Eye Exam - Neuro/Psych  Neuro/Psych  Oriented x3: Yes   Mood/Affect: Normal     Eye Exam - Glare Testing  Glare Testing    Medium High   Right eye 20/80 20/100   Left eye          LENSTAR  Right eye: AL 23.20 ACD 3.16 WTW 12.24       Eye Exam - External Exam  External Exam    Right eye Left eye   External Normal including orbits and preauricular lymph nodes Normal including orbits and preauricular lymph nodes     Eye Exam - Slit Lamp Exam  Slit Lamp Exam    Right eye Left eye   Lids/Lashes Cystic lesion lower lid 2 mm in size (dark brown) Normal lids, lashes, lacrimal glands, and lacrimal drainage   Conjunctiva/Sclera White and quiet White and quiet   Cornea Normal epithelium, stroma, endothelium, and tear film Normal epithelium, stroma, endothelium, and tear film   Anterior Chamber Deep and quiet Deep and quiet   Iris Round and reactive Round and reactive   Lens 3+ Nuclear sclerotic cataract, 3+ Posterior subcapsular cataract Posterior chamber intraocular lens   Anterior Vitreous Normal Normal     Eye Exam - Fundus Exam  Fundus Exam    Right eye Left eye   Disc Normal Normal   C/D Ratio 0.55 0.55   Macula Normal Normal   Vessels Normal Normal   Periphery Drusens mid-periphery Drusens mid-periphery     Care Teams  - documented as of this encounter  Care Teams  Team Member Relationship Specialty Start Date End Date   Eleanor Downs DO   NPI: [id number]   [address] [place] [address]   [phone] (Work)   [phone] (Fax)  PCP - General Internal Medicine 4/13/15     Syed Avelar OD   NPI: [id number]   [address] [place], [place] [address] [phone] (Work)    Optometry 2/2/24

## 2024-03-21 NOTE — OP NOTE
Phacoemulsification Cataract with Insertion Intraocular Lens (R) Operative Note     Date: 3/21/2024  OR Location: [facility] OR    Name: Caron Marc, : 1955, Age: 68 y.o., MRN: [id number], Sex: female    Diagnosis  Pre-op Diagnosis     * Combined forms of age-related cataract of right eye [H25.811] Post-op Diagnosis     * Combined forms of age-related cataract of right eye [H25.811]     Procedures  Phacoemulsification Cataract with Insertion Intraocular Lens  42504 - KS XCAPSL CTRC RMVL INSJ IO LENS PROSTH W/O ECP    Surgeons      * Grant Urban - Primary    Resident/Fellow/Other Assistant:  Surgeon(s) and Role:    Procedure Summary  Anesthesia: Monitor Anesthesia Care  ASA: II  Anesthesia Staff: Anesthesiologist: Magdy Ochoa DO  Estimated Blood Loss: None  Intra-op Medications:   Administrations occurring from 0850 to 0930 on 24:   Medication Name Total Dose   lidocaine 1%-phenylephrine 1.5% intravitreal injection 2 mL   lactated Ringer's infusion 50 mL     Anesthesia Record        Intraprocedure I/O Totals       None      Specimen: No specimens collected     Staff:   Circulator: Tammy Gonzalez RN  Scrub Person: Pete Montanez    Drains and/or Catheters: * None in log *    Implants:  Implants       Type Name Action Serial No.      Lens LENS, INTRAOCULAR, SN60WF 20.0 KING - [id number] - [id number] Implanted [id number]        Findings: Combined Form Age Related Cataract Right Eye    Indications: Caron Marc is an 68 y.o. female who is having surgery for Combined forms of age-related cataract of right eye [H25.811]. Decreased vision right eye, blurred vision for near and distance right eye.  Difficulty seeing for watching TV and reading right eye.  Patient states that cataract impedes her ability to see well when driving, as well as other activities of daily living.     The patient was seen in the preoperative area. The risks, benefits, complications, treatment options, non-operative  alternatives, expected recovery and outcomes were discussed with the patient. The possibilities of reaction to medication, pulmonary aspiration, injury to surrounding structures, bleeding, recurrent infection, the need for additional procedures, failure to diagnose a condition, and creating a complication requiring transfusion or operation were discussed with the patient. The patient concurred with the proposed plan, giving informed consent.  The site of surgery was properly noted/marked if necessary per policy. The patient has been actively warmed in preoperative area. Preoperative antibiotics are not indicated. Venous thrombosis prophylaxis are not indicated.    Procedure Details: The patient was correctly identified and the patient's operative eye was marked with a marking pen and verified with the patient in the pre-operative area.   The operative eye was dilated in the preoperative area.  The patient was then taken to the operating room where timeout was performed before starting the procedure. Combined anesthesia  with intravenous sedation and topical tetracaine eyedrops were instilled into the right eye. The operative eye  was prepped and draped in the standard sterile ophthalmic fashion in  preparation for ophthalmic surgery.  A Chiqui wire speculum was then inserted between the eyelids of the right eye and the operating microscope was placed over the right eye.  A paracentesis incision was made approximately 30° away from the planned surgical incision site with the help of MVR blade.  1% lidocaine MPF with Phenylephrine 1.5% PF was injected into the anterior chamber through the paracentesis incision. A near limbal clear corneal incision was fashioned in the temporal quadrant just outside the vascular arcade and Viscoat was injected into anterior chamber to firm the eye. A bent needle cystotome was used and Utrata forceps were utilized to create a continuous curvilinear capsulorrhexis.  BSS was injected  beneath the anterior capsule to hydrodissect the nucleus from adjacent cortex and capsule.  The residual cortex was then aspirated with irrigation/aspiration handpiece. The posterior capsule was then polished with the help of soft irrigation-aspiration tip.  Provisc viscoelastic was then injected into the eye to reform the anterior chamber and to open the capsular bag.  The intraocular lens implant was taken from its sterile wrapping, inspected under the surgical microscope and found to be in good condition. The intraocular lens implant +20.0D was injected into the capsule bag. The Provisc was then aspirated from the anterior chamber and from behind the intraocular lens implant.  The anterior chamber was inflated with the help of BSS to moderate tension.  And the edges of the surgical incision were then hydrated with the help of BSS.  Vigamox was then injected into the anterior chamber and into the capsule bag through the paracentesis incision. The surgical wound was then inspected and found to be watertight.  The wire speculum and drapes were then removed.  Pred Forte eyedrops, Acular eyedrops and Betadine 5% sterile ophthalmic solution were instilled in the conjunctival sac.     The patient tolerated the procedure well and was taken to recovery room in stable condition.    Complications:  None; patient tolerated the procedure well.    Disposition:  Home  Condition: stable     Attending Attestation: I performed the procedure.    Grant Urban  Phone Number: [phone]

## 2024-03-21 NOTE — ANESTHESIA POSTPROCEDURE EVALUATION
Patient: Caron Marc    Procedure Summary       Date: 03/21/24 Room / Location: [facility] OR  / [facility]    Anesthesia Start: 0901 Anesthesia Stop: 0923    Procedure: Phacoemulsification Cataract with Insertion Intraocular Lens (Right: Eye) Diagnosis:       Combined forms of age-related cataract of right eye      (Combined forms of age-related cataract of right eye [H25.811])    Surgeons: Grant Urban MD Responsible Provider: Magdy Ochoa DO    Anesthesia Type: MAC ASA Status: 2            Anesthesia Type: MAC    Vitals Value Taken Time   /82 03/21/24 0926   Temp 98.8 03/21/24 0926   Pulse 84 03/21/24 0926   Resp 16 03/21/24 0926   SpO2 96 03/21/24 0926       Anesthesia Post Evaluation    Patient location during evaluation: bedside  Patient participation: complete - patient participated  Level of consciousness: awake  Pain score: 0  Pain management: adequate  Multimodal analgesia pain management approach  Airway patency: patent  Cardiovascular status: acceptable  Respiratory status: acceptable  Hydration status: acceptable  Postoperative Nausea and Vomiting: none  Comments: Awake and comfortable. .  VSS.  Denies pain or nausea.        No notable events documented.

## 2024-03-21 NOTE — DISCHARGE INSTRUCTIONS
Please see enclosed instructions from Dr. Urban regarding eye drop schedule, restrictions and use of eye shield.    Please take bag with eye drops that were given to you today as well as ALL eye drops that you are using at home with you to your appointment tomorrow at Dr. Urban's office.

## 2024-04-03 ENCOUNTER — OFFICE VISIT (OUTPATIENT)
Dept: PRIMARY CARE | Facility: CLINIC | Age: 69
End: 2024-04-03
Payer: MEDICARE

## 2024-04-03 VITALS
WEIGHT: 160 LBS | SYSTOLIC BLOOD PRESSURE: 116 MMHG | OXYGEN SATURATION: 98 % | DIASTOLIC BLOOD PRESSURE: 80 MMHG | BODY MASS INDEX: 28.35 KG/M2 | HEIGHT: 63 IN | HEART RATE: 68 BPM

## 2024-04-03 DIAGNOSIS — E03.9 ACQUIRED HYPOTHYROIDISM: ICD-10-CM

## 2024-04-03 DIAGNOSIS — M81.0 AGE RELATED OSTEOPOROSIS, UNSPECIFIED PATHOLOGICAL FRACTURE PRESENCE: ICD-10-CM

## 2024-04-03 DIAGNOSIS — T78.40XS ALLERGY, SEQUELA: ICD-10-CM

## 2024-04-03 DIAGNOSIS — F41.8 ANXIETY WITH DEPRESSION: ICD-10-CM

## 2024-04-03 DIAGNOSIS — I15.9 SECONDARY HYPERTENSION: ICD-10-CM

## 2024-04-03 DIAGNOSIS — E78.00 ELEVATED LDL CHOLESTEROL LEVEL: Primary | ICD-10-CM

## 2024-04-03 PROBLEM — R69 TAKING MEDICATION FOR CHRONIC DISEASE: Status: RESOLVED | Noted: 2023-10-04 | Resolved: 2024-04-03

## 2024-04-03 PROBLEM — E78.2 MIXED HYPERLIPIDEMIA: Status: RESOLVED | Noted: 2018-05-09 | Resolved: 2024-04-03

## 2024-04-03 PROCEDURE — 1036F TOBACCO NON-USER: CPT | Performed by: INTERNAL MEDICINE

## 2024-04-03 PROCEDURE — 1160F RVW MEDS BY RX/DR IN RCRD: CPT | Performed by: INTERNAL MEDICINE

## 2024-04-03 PROCEDURE — 99214 OFFICE O/P EST MOD 30 MIN: CPT | Performed by: INTERNAL MEDICINE

## 2024-04-03 PROCEDURE — 3074F SYST BP LT 130 MM HG: CPT | Performed by: INTERNAL MEDICINE

## 2024-04-03 PROCEDURE — 3079F DIAST BP 80-89 MM HG: CPT | Performed by: INTERNAL MEDICINE

## 2024-04-03 PROCEDURE — 1159F MED LIST DOCD IN RCRD: CPT | Performed by: INTERNAL MEDICINE

## 2024-04-03 RX ORDER — HYDROCODONE BITARTRATE AND ACETAMINOPHEN 5; 325 MG/1; MG/1
1 TABLET ORAL EVERY 4 HOURS PRN
COMMUNITY
Start: 2024-03-04

## 2024-04-03 RX ORDER — METOPROLOL SUCCINATE 25 MG/1
25 TABLET, EXTENDED RELEASE ORAL DAILY
Qty: 90 TABLET | Refills: 1 | Status: SHIPPED | OUTPATIENT
Start: 2024-04-03

## 2024-04-03 RX ORDER — MONTELUKAST SODIUM 10 MG/1
10 TABLET ORAL DAILY
COMMUNITY
End: 2024-04-03 | Stop reason: SDUPTHER

## 2024-04-03 RX ORDER — LEVOTHYROXINE SODIUM 88 UG/1
88 TABLET ORAL
Qty: 90 TABLET | Refills: 1 | Status: SHIPPED | OUTPATIENT
Start: 2024-04-03

## 2024-04-03 RX ORDER — TRAMADOL HYDROCHLORIDE 50 MG/1
50 TABLET ORAL EVERY 6 HOURS PRN
COMMUNITY
Start: 2024-03-13

## 2024-04-03 RX ORDER — MONTELUKAST SODIUM 10 MG/1
10 TABLET ORAL DAILY
Qty: 90 TABLET | Refills: 1 | Status: SHIPPED | OUTPATIENT
Start: 2024-04-03

## 2024-04-03 RX ORDER — LISINOPRIL AND HYDROCHLOROTHIAZIDE 10; 12.5 MG/1; MG/1
1 TABLET ORAL DAILY
Qty: 90 TABLET | Refills: 1 | Status: SHIPPED | OUTPATIENT
Start: 2024-04-03

## 2024-04-03 RX ORDER — VENLAFAXINE HYDROCHLORIDE 150 MG/1
150 CAPSULE, EXTENDED RELEASE ORAL DAILY
Qty: 90 CAPSULE | Refills: 1 | Status: SHIPPED | OUTPATIENT
Start: 2024-04-03

## 2024-04-03 RX ORDER — ATORVASTATIN CALCIUM 40 MG/1
40 TABLET, FILM COATED ORAL DAILY
Qty: 90 TABLET | Refills: 1 | Status: SHIPPED | OUTPATIENT
Start: 2024-04-03

## 2024-04-03 ASSESSMENT — ENCOUNTER SYMPTOMS
WHEEZING: 0
SHORTNESS OF BREATH: 0
ABDOMINAL PAIN: 0
ARTHRALGIAS: 0
PALPITATIONS: 0
BACK PAIN: 0
CHEST TIGHTNESS: 0
DIARRHEA: 0
COUGH: 0
VOMITING: 0
NAUSEA: 0
BLOOD IN STOOL: 0
FATIGUE: 1

## 2024-04-03 NOTE — ASSESSMENT & PLAN NOTE
-Her current blood pressure is excellent and she will continue with her current antihypertensive regimen

## 2024-04-03 NOTE — ASSESSMENT & PLAN NOTE
-We will be checking a TSH just prior to her next follow-up visit and she will continue with her levothyroxine 88 mcg

## 2024-04-03 NOTE — ASSESSMENT & PLAN NOTE
-She will be getting a fasting lipid profile just prior to her next follow-up visit and we will continue with atorvastatin 40 mg daily

## 2024-04-03 NOTE — PROGRESS NOTES
Subjective   Patient ID: Caron Marc is a 68 y.o. female who presents for Follow-up (6 MO FUV).  HPI  She is here today for her routine 6-month checkup.  Her blood pressure is excellent.  Since her last visit she had successful cataract surgery with Dr. Urban and she states everything went really well.  And then unfortunately her dog jumped up on her on February 21 causing her to fall and suffer a acute nondisplaced right lateral tibial plateau fracture.  She was seen in the emergency room and then had follow-up care with orthopedics.  She was able to get by with a Velcro splint and she has been on crutches up until recently.  She states that during the whole time of her healing process she sat for long hours and she is developed a sore back with sore legs.  She states she started to become more mobile and getting out and doing more.  She denies any numbness, tingling, or weakness of her lower extremities and no bowel or bladder dysfunction.  We agreed that she should get better as she starts to increase her mobility and she will call us otherwise.  We did review her problem list and we have provided refills on all of her medications.  We decided to have her return in approximately 6 months and just prior to that visit she will be getting fasting lab work.  Review of Systems   Constitutional:  Positive for fatigue.   Respiratory:  Negative for cough, chest tightness, shortness of breath and wheezing.    Cardiovascular:  Negative for chest pain, palpitations and leg swelling.   Gastrointestinal:  Negative for abdominal pain, blood in stool, diarrhea, nausea and vomiting.   Musculoskeletal:  Negative for arthralgias and back pain.     Objective   Physical Exam  Vitals and nursing note reviewed.   Constitutional:       General: She is not in acute distress.     Appearance: Normal appearance.   HENT:      Head: Normocephalic and atraumatic.   Eyes:      Conjunctiva/sclera: Conjunctivae normal.   Cardiovascular:       Rate and Rhythm: Normal rate and regular rhythm.      Heart sounds: Normal heart sounds.   Pulmonary:      Effort: No respiratory distress.      Breath sounds: No wheezing.   Abdominal:      Palpations: Abdomen is soft.      Tenderness: There is no abdominal tenderness. There is no guarding.   Musculoskeletal:         General: No swelling. Normal range of motion.   Skin:     General: Skin is warm and dry.   Neurological:      General: No focal deficit present.      Mental Status: She is alert and oriented to person, place, and time.   Psychiatric:         Behavior: Behavior normal.       Assessment/Plan   Problem List Items Addressed This Visit             ICD-10-CM    Allergies T78.40XA     -She reports that responding well to her montelukast so we provided a refill today         Relevant Medications    montelukast (Singulair) 10 mg tablet    Anxiety with depression F41.8     -Doing well at this time and will continue with Effexor  mg daily         Relevant Medications    venlafaxine XR (Effexor-XR) 150 mg 24 hr capsule    Elevated LDL cholesterol level - Primary E78.00     -She will be getting a fasting lipid profile just prior to her next follow-up visit and we will continue with atorvastatin 40 mg daily         Relevant Medications    atorvastatin (Lipitor) 40 mg tablet    Other Relevant Orders    Lipid Panel    Hypertension I10     -Her current blood pressure is excellent and she will continue with her current antihypertensive regimen         Relevant Medications    lisinopriL-hydrochlorothiazide 10-12.5 mg tablet    metoprolol succinate XL (Toprol-XL) 25 mg 24 hr tablet    Other Relevant Orders    Basic Metabolic Panel    Osteoporosis M81.0     -She is receiving IV Reclast as directed         Acquired hypothyroidism E03.9     -We will be checking a TSH just prior to her next follow-up visit and she will continue with her levothyroxine 88 mcg         Relevant Medications    levothyroxine (Synthroid,  Levoxyl) 88 mcg tablet    Other Relevant Orders    TSH          Elaenor Downs, DO

## 2024-04-03 NOTE — PATIENT INSTRUCTIONS
As we discussed I have sent refills for all of your medications and please call if you have any problems with the pharmacy  For your back discomfort I do recommend you start getting up and walking as permitted by your surgeon.  If your back issues do not improve please call me and please call right away if you develop any numbness, tingling, or weakness of the lower extremities  We invite you to return in approximately 6 months and just prior to that visit you will need to remember to go for fasting lab work

## 2024-05-02 ENCOUNTER — OFFICE VISIT (OUTPATIENT)
Dept: PRIMARY CARE | Facility: CLINIC | Age: 69
End: 2024-05-02
Payer: MEDICARE

## 2024-05-02 VITALS
SYSTOLIC BLOOD PRESSURE: 122 MMHG | WEIGHT: 153 LBS | HEART RATE: 73 BPM | DIASTOLIC BLOOD PRESSURE: 86 MMHG | OXYGEN SATURATION: 95 % | BODY MASS INDEX: 27.11 KG/M2 | HEIGHT: 63 IN

## 2024-05-02 DIAGNOSIS — J30.2 SEASONAL ALLERGIES: Primary | ICD-10-CM

## 2024-05-02 PROCEDURE — 99213 OFFICE O/P EST LOW 20 MIN: CPT | Performed by: FAMILY MEDICINE

## 2024-05-02 PROCEDURE — 1036F TOBACCO NON-USER: CPT | Performed by: FAMILY MEDICINE

## 2024-05-02 PROCEDURE — 1160F RVW MEDS BY RX/DR IN RCRD: CPT | Performed by: FAMILY MEDICINE

## 2024-05-02 PROCEDURE — 1159F MED LIST DOCD IN RCRD: CPT | Performed by: FAMILY MEDICINE

## 2024-05-02 PROCEDURE — 3074F SYST BP LT 130 MM HG: CPT | Performed by: FAMILY MEDICINE

## 2024-05-02 PROCEDURE — 3079F DIAST BP 80-89 MM HG: CPT | Performed by: FAMILY MEDICINE

## 2024-05-02 RX ORDER — METHYLPREDNISOLONE 4 MG/1
TABLET ORAL
Qty: 21 TABLET | Refills: 0 | Status: SHIPPED | OUTPATIENT
Start: 2024-05-02 | End: 2024-05-09

## 2024-05-02 NOTE — PROGRESS NOTES
"     Subjective  Caron Marc is a 68 y.o. female who presents for Sinusitis.  Sinusitis      Pt presents with postnasal drainage.  Was outside all day yesterday, has headache.  State day five of symptoms.  Woke up this morning and had to lie back down, was dizzy.  Felt dizzy, only with movement of the head.     Has flonase and singulair.    Does not feel short of breath.  No fevers.  Former smoker.  Eyes itching and watering.  Pressure in both maxillary sinus areas.  Has deviated septum.    Review of Systems   All other systems reviewed and are negative.  .    Objective     Visit Vitals  /86   Pulse 73      Physical Exam  Vitals reviewed.   HENT:      Head: Normocephalic.      Right Ear: Hearing normal. A middle ear effusion is present.      Left Ear: Hearing normal. A middle ear effusion is present.      Mouth/Throat:      Mouth: Mucous membranes are moist.      Comments: Clear drainage  Eyes:      Comments: \"Allergic shiners\"   Cardiovascular:      Rate and Rhythm: Normal rate and regular rhythm.   Pulmonary:      Effort: Pulmonary effort is normal. No respiratory distress.      Breath sounds: Normal breath sounds. No wheezing.   Neurological:      General: No focal deficit present.      Mental Status: She is alert.   Psychiatric:         Mood and Affect: Mood normal.         Assessment/Plan   Problem List Items Addressed This Visit    None  Visit Diagnoses       Seasonal allergies    -  Primary    Relevant Medications    methylPREDNISolone (Medrol Dospak) 4 mg tablets          Add claritin 10 mg to current regimen as well, call concerns.  Side effects of medication discussed  Follow 1 week with Dr Reyes.         Yolis Parsons MD   "

## 2024-05-07 ENCOUNTER — APPOINTMENT (OUTPATIENT)
Dept: PRIMARY CARE | Facility: CLINIC | Age: 69
End: 2024-05-07
Payer: MEDICARE

## 2024-06-22 DIAGNOSIS — G47.09 OTHER INSOMNIA: ICD-10-CM

## 2024-06-24 RX ORDER — TRAZODONE HYDROCHLORIDE 100 MG/1
100 TABLET ORAL NIGHTLY PRN
Qty: 30 TABLET | Refills: 2 | Status: SHIPPED | OUTPATIENT
Start: 2024-06-24

## 2024-08-09 DIAGNOSIS — T78.40XS ALLERGY, SEQUELA: ICD-10-CM

## 2024-08-09 RX ORDER — MONTELUKAST SODIUM 10 MG/1
10 TABLET ORAL DAILY
Qty: 90 TABLET | Refills: 1 | Status: SHIPPED | OUTPATIENT
Start: 2024-08-09

## 2024-09-09 DIAGNOSIS — I15.9 SECONDARY HYPERTENSION: ICD-10-CM

## 2024-09-09 DIAGNOSIS — F41.8 ANXIETY WITH DEPRESSION: ICD-10-CM

## 2024-09-12 RX ORDER — VENLAFAXINE HYDROCHLORIDE 150 MG/1
150 CAPSULE, EXTENDED RELEASE ORAL DAILY
Qty: 90 CAPSULE | Refills: 0 | Status: SHIPPED | OUTPATIENT
Start: 2024-09-12

## 2024-09-12 RX ORDER — METOPROLOL SUCCINATE 25 MG/1
25 TABLET, EXTENDED RELEASE ORAL DAILY
Qty: 90 TABLET | Refills: 0 | Status: SHIPPED | OUTPATIENT
Start: 2024-09-12

## 2024-09-12 RX ORDER — LISINOPRIL AND HYDROCHLOROTHIAZIDE 10; 12.5 MG/1; MG/1
1 TABLET ORAL DAILY
Qty: 90 TABLET | Refills: 0 | Status: SHIPPED | OUTPATIENT
Start: 2024-09-12

## 2024-09-17 ENCOUNTER — TELEPHONE (OUTPATIENT)
Dept: PRIMARY CARE | Facility: CLINIC | Age: 69
End: 2024-09-17
Payer: MEDICARE

## 2024-09-18 DIAGNOSIS — F40.243 FEAR OF FLYING: Primary | ICD-10-CM

## 2024-09-18 RX ORDER — LORAZEPAM 0.5 MG/1
0.5 TABLET ORAL EVERY 6 HOURS PRN
Qty: 4 TABLET | Refills: 0 | Status: SHIPPED | OUTPATIENT
Start: 2024-09-18 | End: 2024-09-19

## 2024-09-18 NOTE — TELEPHONE ENCOUNTER
Please advise that I went ahead and sent the prescription to her pharmacy for lorazepam 0.5 mg to be taken every 6 hours as needed for anxiety.  I gave her enough tablets for 24 hours of flying.  Please advise that she should not drive for 6 hours after taking this medicine and not combine this medicine with alcohol.  We also just tell everybody not to share her medication with anybody else.  Please tell her that I hope she has a comfortable trip and hopefully a relaxing destination.  Thank you!

## 2024-10-01 ENCOUNTER — LAB (OUTPATIENT)
Dept: LAB | Facility: LAB | Age: 69
End: 2024-10-01
Payer: MEDICARE

## 2024-10-01 DIAGNOSIS — I15.9 SECONDARY HYPERTENSION: ICD-10-CM

## 2024-10-01 DIAGNOSIS — E78.2 MIXED HYPERLIPIDEMIA: ICD-10-CM

## 2024-10-01 DIAGNOSIS — E78.00 ELEVATED LDL CHOLESTEROL LEVEL: ICD-10-CM

## 2024-10-01 DIAGNOSIS — E03.9 ACQUIRED HYPOTHYROIDISM: ICD-10-CM

## 2024-10-01 LAB
ANION GAP SERPL CALC-SCNC: 12 MMOL/L (ref 10–20)
BUN SERPL-MCNC: 13 MG/DL (ref 6–23)
CALCIUM SERPL-MCNC: 9.2 MG/DL (ref 8.6–10.3)
CHLORIDE SERPL-SCNC: 104 MMOL/L (ref 98–107)
CHOLEST SERPL-MCNC: 151 MG/DL (ref 0–199)
CHOLESTEROL/HDL RATIO: 2.8
CO2 SERPL-SCNC: 28 MMOL/L (ref 21–32)
CREAT SERPL-MCNC: 0.83 MG/DL (ref 0.5–1.05)
EGFRCR SERPLBLD CKD-EPI 2021: 76 ML/MIN/1.73M*2
GLUCOSE SERPL-MCNC: 91 MG/DL (ref 74–99)
HDLC SERPL-MCNC: 54 MG/DL
LDLC SERPL CALC-MCNC: 57 MG/DL
NON HDL CHOLESTEROL: 97 MG/DL (ref 0–149)
POTASSIUM SERPL-SCNC: 3.8 MMOL/L (ref 3.5–5.3)
SODIUM SERPL-SCNC: 140 MMOL/L (ref 136–145)
TRIGL SERPL-MCNC: 199 MG/DL (ref 0–149)
TSH SERPL-ACNC: 2.84 MIU/L (ref 0.44–3.98)
VLDL: 40 MG/DL (ref 0–40)

## 2024-10-01 PROCEDURE — 84443 ASSAY THYROID STIM HORMONE: CPT

## 2024-10-01 PROCEDURE — 80061 LIPID PANEL: CPT

## 2024-10-01 PROCEDURE — 80048 BASIC METABOLIC PNL TOTAL CA: CPT

## 2024-10-01 PROCEDURE — 36415 COLL VENOUS BLD VENIPUNCTURE: CPT

## 2024-10-02 ENCOUNTER — APPOINTMENT (OUTPATIENT)
Dept: PRIMARY CARE | Facility: CLINIC | Age: 69
End: 2024-10-02
Payer: MEDICARE

## 2024-10-02 VITALS
BODY MASS INDEX: 28 KG/M2 | HEART RATE: 95 BPM | OXYGEN SATURATION: 97 % | DIASTOLIC BLOOD PRESSURE: 62 MMHG | SYSTOLIC BLOOD PRESSURE: 122 MMHG | WEIGHT: 158 LBS | HEIGHT: 63 IN

## 2024-10-02 DIAGNOSIS — F41.8 ANXIETY WITH DEPRESSION: ICD-10-CM

## 2024-10-02 DIAGNOSIS — E03.9 ACQUIRED HYPOTHYROIDISM: ICD-10-CM

## 2024-10-02 DIAGNOSIS — Z23 ENCOUNTER FOR IMMUNIZATION: ICD-10-CM

## 2024-10-02 DIAGNOSIS — E78.00 ELEVATED LDL CHOLESTEROL LEVEL: ICD-10-CM

## 2024-10-02 DIAGNOSIS — I15.9 SECONDARY HYPERTENSION: ICD-10-CM

## 2024-10-02 DIAGNOSIS — Z00.00 MEDICARE ANNUAL WELLNESS VISIT, SUBSEQUENT: Primary | ICD-10-CM

## 2024-10-02 PROBLEM — H25.811 COMBINED FORMS OF AGE-RELATED CATARACT OF RIGHT EYE: Status: RESOLVED | Noted: 2024-03-14 | Resolved: 2024-10-02

## 2024-10-02 PROBLEM — H61.91 DISORDER OF RIGHT EAR LOBE: Status: RESOLVED | Noted: 2024-01-04 | Resolved: 2024-10-02

## 2024-10-02 PROBLEM — H25.812 COMBINED FORMS OF AGE-RELATED CATARACT OF LEFT EYE: Status: RESOLVED | Noted: 2024-02-29 | Resolved: 2024-10-02

## 2024-10-02 PROBLEM — E03.8 SECONDARY HYPOTHYROIDISM: Status: RESOLVED | Noted: 2023-10-04 | Resolved: 2024-10-02

## 2024-10-02 PROBLEM — F40.243 FEAR OF FLYING: Status: RESOLVED | Noted: 2024-09-18 | Resolved: 2024-10-02

## 2024-10-02 PROBLEM — Z01.818 PREOPERATIVE CLEARANCE: Status: RESOLVED | Noted: 2024-02-08 | Resolved: 2024-10-02

## 2024-10-02 PROCEDURE — G0439 PPPS, SUBSEQ VISIT: HCPCS | Performed by: INTERNAL MEDICINE

## 2024-10-02 PROCEDURE — 3008F BODY MASS INDEX DOCD: CPT | Performed by: INTERNAL MEDICINE

## 2024-10-02 PROCEDURE — 1160F RVW MEDS BY RX/DR IN RCRD: CPT | Performed by: INTERNAL MEDICINE

## 2024-10-02 PROCEDURE — 1124F ACP DISCUSS-NO DSCNMKR DOCD: CPT | Performed by: INTERNAL MEDICINE

## 2024-10-02 PROCEDURE — 90673 RIV3 VACCINE NO PRESERV IM: CPT | Performed by: INTERNAL MEDICINE

## 2024-10-02 PROCEDURE — 1170F FXNL STATUS ASSESSED: CPT | Performed by: INTERNAL MEDICINE

## 2024-10-02 PROCEDURE — G0008 ADMIN INFLUENZA VIRUS VAC: HCPCS | Performed by: INTERNAL MEDICINE

## 2024-10-02 PROCEDURE — 3078F DIAST BP <80 MM HG: CPT | Performed by: INTERNAL MEDICINE

## 2024-10-02 PROCEDURE — 1036F TOBACCO NON-USER: CPT | Performed by: INTERNAL MEDICINE

## 2024-10-02 PROCEDURE — 99214 OFFICE O/P EST MOD 30 MIN: CPT | Performed by: INTERNAL MEDICINE

## 2024-10-02 PROCEDURE — 3074F SYST BP LT 130 MM HG: CPT | Performed by: INTERNAL MEDICINE

## 2024-10-02 PROCEDURE — 1159F MED LIST DOCD IN RCRD: CPT | Performed by: INTERNAL MEDICINE

## 2024-10-02 RX ORDER — LISINOPRIL AND HYDROCHLOROTHIAZIDE 10; 12.5 MG/1; MG/1
1 TABLET ORAL DAILY
Qty: 90 TABLET | Refills: 1 | Status: SHIPPED | OUTPATIENT
Start: 2024-10-02

## 2024-10-02 RX ORDER — LEVOTHYROXINE SODIUM 88 UG/1
88 TABLET ORAL
Qty: 90 TABLET | Refills: 1 | Status: SHIPPED | OUTPATIENT
Start: 2024-10-02

## 2024-10-02 RX ORDER — METOPROLOL SUCCINATE 25 MG/1
25 TABLET, EXTENDED RELEASE ORAL DAILY
Qty: 90 TABLET | Refills: 1 | Status: SHIPPED | OUTPATIENT
Start: 2024-10-02

## 2024-10-02 RX ORDER — ATORVASTATIN CALCIUM 40 MG/1
40 TABLET, FILM COATED ORAL DAILY
Qty: 90 TABLET | Refills: 1 | Status: SHIPPED | OUTPATIENT
Start: 2024-10-02

## 2024-10-02 RX ORDER — VENLAFAXINE HYDROCHLORIDE 150 MG/1
150 CAPSULE, EXTENDED RELEASE ORAL DAILY
Qty: 90 CAPSULE | Refills: 1 | Status: SHIPPED | OUTPATIENT
Start: 2024-10-02

## 2024-10-02 ASSESSMENT — ENCOUNTER SYMPTOMS
COUGH: 0
BLOOD IN STOOL: 0
NAUSEA: 0
SHORTNESS OF BREATH: 0
BACK PAIN: 0
WHEEZING: 0
DIARRHEA: 0
FATIGUE: 1
ABDOMINAL PAIN: 0
PALPITATIONS: 0
VOMITING: 0

## 2024-10-02 ASSESSMENT — PATIENT HEALTH QUESTIONNAIRE - PHQ9
1. LITTLE INTEREST OR PLEASURE IN DOING THINGS: NOT AT ALL
1. LITTLE INTEREST OR PLEASURE IN DOING THINGS: NOT AT ALL
2. FEELING DOWN, DEPRESSED OR HOPELESS: NOT AT ALL
SUM OF ALL RESPONSES TO PHQ9 QUESTIONS 1 AND 2: 0
2. FEELING DOWN, DEPRESSED OR HOPELESS: NOT AT ALL
SUM OF ALL RESPONSES TO PHQ9 QUESTIONS 1 AND 2: 0

## 2024-10-02 ASSESSMENT — ACTIVITIES OF DAILY LIVING (ADL)
GROCERY_SHOPPING: INDEPENDENT
DRESSING: INDEPENDENT
TAKING_MEDICATION: INDEPENDENT
BATHING: INDEPENDENT
DOING_HOUSEWORK: INDEPENDENT
MANAGING_FINANCES: INDEPENDENT

## 2024-10-02 NOTE — PATIENT INSTRUCTIONS
As we discussed I think that overall you are doing well and based on your checkup today  Please try to cut back on your cheese and or use by 50% to see if this will help your triglycerides  I sent you a handout via [employer] on ways to reduce your risk of falls in the future  I suggest that you speak to your specialist about your neck pain and find a way to perhaps reduce your daily intake of ibuprofen  Please remember to drop off a copy of your living will and power of  for healthcare  If everything goes according to plan we will see you back in 6 months for another checkup and please remember to get lab work done prior to that visit

## 2024-10-02 NOTE — PROGRESS NOTES
Subjective   Reason for Visit: Caron Marc is an 68 y.o. female here for a Medicare Wellness visit.     Reviewed all medications by prescribing practitioner or clinical pharmacist (such as prescriptions, OTCs, herbal therapies and supplements) and documented in the medical record.    HPI  She is here today for a routine checkup and we also took this opportunity to complete her annual Medicare wellness visit.  She currently denies any symptoms of depression and she did have a fall back in February.  She is seeing her endocrinologist and they are doing a workup for her bone metabolism.  She states she feels like her memory is okay and she is highly independent doing everything for herself including managing her own finances and her own medications.  Her hearing appears to be good.  She does try to eat a healthy diet.  We talked about the importance of exercise and we remind her to try to get 30 minutes of aerobic activity 5 days a week.  She states she has completed her advance directives and we asked that she bring a copy for her chart.  We also conducted a review of systems and she does suffer from chronic neck pain.  She was seeing Dr. Ortiz but will be seeing a new practitioner for aches and pains.  She states that she has been accustomed to taking ibuprofen 800 mg each evening and we talked about some of the potential long-term side effects from this medicine.  She also tried the trazodone for sleep but she did not tolerate it well.  She states she has been taking Advil PM which has worked well for her.  We did review her laboratory test results.  I am pleased with her cholesterol except her triglycerides were high.  She states she enjoys cheese on most of her meals and also enjoys Oreos.  We talked about cutting back by 50%.  Her thyroid blood test was perfect this time as well as her kidney function.  We are providing refills on all of her medications and if everything goes according to plan we will see  "her back in 6 months for another checkup.    Patient Care Team:  Eleanor Downs DO as PCP - General     Review of Systems   Constitutional:  Positive for fatigue.   Respiratory:  Negative for cough, shortness of breath and wheezing.    Cardiovascular:  Negative for chest pain, palpitations and leg swelling.   Gastrointestinal:  Negative for abdominal pain, blood in stool, diarrhea, nausea and vomiting.   Musculoskeletal:  Negative for back pain.       Objective   Vitals:  /62   Pulse 95   Ht 1.6 m (5' 2.99\")   Wt 71.7 kg (158 lb)   SpO2 97%   BMI 28.00 kg/m²       Physical Exam  Vitals and nursing note reviewed.   Constitutional:       General: She is not in acute distress.     Appearance: Normal appearance.   HENT:      Head: Normocephalic and atraumatic.   Eyes:      Conjunctiva/sclera: Conjunctivae normal.   Cardiovascular:      Rate and Rhythm: Normal rate and regular rhythm.      Heart sounds: Normal heart sounds.   Pulmonary:      Effort: No respiratory distress.      Breath sounds: No wheezing.   Abdominal:      Palpations: Abdomen is soft.      Tenderness: There is no abdominal tenderness. There is no guarding.   Musculoskeletal:         General: No swelling. Normal range of motion.   Skin:     General: Skin is warm and dry.   Neurological:      General: No focal deficit present.      Mental Status: She is alert and oriented to person, place, and time.   Psychiatric:         Behavior: Behavior normal.       Recent Results (from the past 672 hour(s))   Lipid Panel    Collection Time: 10/01/24  9:29 AM   Result Value Ref Range    Cholesterol 151 0 - 199 mg/dL    HDL-Cholesterol 54.0 mg/dL    Cholesterol/HDL Ratio 2.8     LDL Calculated 57 <=99 mg/dL    VLDL 40 0 - 40 mg/dL    Triglycerides 199 (H) 0 - 149 mg/dL    Non HDL Cholesterol 97 0 - 149 mg/dL   Basic Metabolic Panel    Collection Time: 10/01/24  9:29 AM   Result Value Ref Range    Glucose 91 74 - 99 mg/dL    Sodium 140 136 - 145 mmol/L    " Potassium 3.8 3.5 - 5.3 mmol/L    Chloride 104 98 - 107 mmol/L    Bicarbonate 28 21 - 32 mmol/L    Anion Gap 12 10 - 20 mmol/L    Urea Nitrogen 13 6 - 23 mg/dL    Creatinine 0.83 0.50 - 1.05 mg/dL    eGFR 76 >60 mL/min/1.73m*2    Calcium 9.2 8.6 - 10.3 mg/dL   TSH    Collection Time: 10/01/24  9:29 AM   Result Value Ref Range    Thyroid Stimulating Hormone 2.84 0.44 - 3.98 mIU/L   \  Assessment & Plan  Encounter for immunization  -She will read the handout I sent to her today via [employer] on ways to reduce her risk of falls  -She will bring in a copy of her living will and power of  for health    Orders:    Flu vaccine, trivalent, preservative free, no egg protein, age 18y+ (Flublok)    Medicare annual wellness visit, subsequent  -She will read the handout I sent to her today via [employer] on ways to reduce her risk of falls  -She will bring in a copy of her living will and power of  for health         Elevated LDL cholesterol level  -Her cholesterol profile was very good with the exception of triglycerides  -She will cut back on her cheese and Oreos    Orders:    atorvastatin (Lipitor) 40 mg tablet; Take 1 tablet (40 mg) by mouth once daily.    Acquired hypothyroidism  -Thyroid blood test is well within desirable range and we will check this once a year    Orders:    levothyroxine (Synthroid, Levoxyl) 88 mcg tablet; Take 1 tablet (88 mcg) by mouth once daily.    Secondary hypertension  -Currently well-controlled so we will continue with her current antihypertensive regimen    Orders:    lisinopriL-hydrochlorothiazide 10-12.5 mg tablet; Take 1 tablet by mouth once daily.    metoprolol succinate XL (Toprol-XL) 25 mg 24 hr tablet; Take 1 tablet (25 mg) by mouth once daily.    Basic Metabolic Panel; Future    Anxiety with depression  -Reports feeling well on the Effexor    Orders:    venlafaxine XR (Effexor-XR) 150 mg 24 hr capsule; Take 1 capsule (150 mg) by mouth once daily.     PT INSTRUCTIONS  As we  discussed I think that overall you are doing well and based on your checkup today  Please try to cut back on your cheese and or use by 50% to see if this will help your triglycerides  I sent you a handout via [employer] on ways to reduce your risk of falls in the future  I suggest that you speak to your specialist about your neck pain and find a way to perhaps reduce your daily intake of ibuprofen  Please remember to drop off a copy of your living will and power of  for healthcare  If everything goes according to plan we will see you back in 6 months for another checkup and please remember to get lab work done prior to that visit

## 2024-10-02 NOTE — ASSESSMENT & PLAN NOTE
-Currently well-controlled so we will continue with her current antihypertensive regimen    Orders:    lisinopriL-hydrochlorothiazide 10-12.5 mg tablet; Take 1 tablet by mouth once daily.    metoprolol succinate XL (Toprol-XL) 25 mg 24 hr tablet; Take 1 tablet (25 mg) by mouth once daily.    Basic Metabolic Panel; Future    
-Her cholesterol profile was very good with the exception of triglycerides  -She will cut back on her cheese and Oreos    Orders:    atorvastatin (Lipitor) 40 mg tablet; Take 1 tablet (40 mg) by mouth once daily.    
-Reports feeling well on the Effexor    Orders:    venlafaxine XR (Effexor-XR) 150 mg 24 hr capsule; Take 1 capsule (150 mg) by mouth once daily.    
-She will read the handout I sent to her today via [employer] on ways to reduce her risk of falls  -She will bring in a copy of her living will and power of  for health         
-She will read the handout I sent to her today via [employer] on ways to reduce her risk of falls  -She will bring in a copy of her living will and power of  for health    Orders:    Flu vaccine, trivalent, preservative free, no egg protein, age 18y+ (Flublok)    
-Thyroid blood test is well within desirable range and we will check this once a year    Orders:    levothyroxine (Synthroid, Levoxyl) 88 mcg tablet; Take 1 tablet (88 mcg) by mouth once daily.    
7
yes

## 2024-12-02 ENCOUNTER — OFFICE VISIT (OUTPATIENT)
Dept: URGENT CARE | Facility: CLINIC | Age: 69
End: 2024-12-02
Payer: MEDICARE

## 2024-12-02 VITALS
HEIGHT: 63 IN | HEART RATE: 91 BPM | RESPIRATION RATE: 14 BRPM | SYSTOLIC BLOOD PRESSURE: 129 MMHG | OXYGEN SATURATION: 96 % | TEMPERATURE: 97.1 F | WEIGHT: 155 LBS | BODY MASS INDEX: 27.46 KG/M2 | DIASTOLIC BLOOD PRESSURE: 89 MMHG

## 2024-12-02 DIAGNOSIS — J01.00 ACUTE NON-RECURRENT MAXILLARY SINUSITIS: Primary | ICD-10-CM

## 2024-12-02 PROCEDURE — 99213 OFFICE O/P EST LOW 20 MIN: CPT | Performed by: NURSE PRACTITIONER

## 2024-12-02 RX ORDER — PREDNISONE 10 MG/1
30 TABLET ORAL DAILY
Qty: 15 TABLET | Refills: 0 | Status: SHIPPED | OUTPATIENT
Start: 2024-12-02 | End: 2024-12-07

## 2024-12-02 RX ORDER — AMOXICILLIN AND CLAVULANATE POTASSIUM 875; 125 MG/1; MG/1
1 TABLET, FILM COATED ORAL 2 TIMES DAILY
Qty: 14 TABLET | Refills: 0 | Status: SHIPPED | OUTPATIENT
Start: 2024-12-02 | End: 2024-12-09

## 2024-12-02 NOTE — PROGRESS NOTES
69 y.o. female patient presents for evaluation of sinus pressure. Patient reports 10 days of progressively worsening maxillary sinus pain, nasal congestion, and headache. There is reported mild postnasal drip and ear pressure. No fever, cough, or other constitutional signs and symptoms. Symptoms have been refractory to over-the-counter medications.      Vitals:    24 1125   BP: 129/89   Pulse: 91   Resp: 14   Temp: 36.2 °C (97.1 °F)   SpO2: 96%       No Known Allergies    Medication Documentation Review Audit       Reviewed by Meghann Parsons MA (Medical Assistant) on 24 at 1122      Medication Order Taking? Sig Documenting Provider Last Dose Status   aspirin (Aspir-81) 81 mg EC tablet [id number] Yes Take 1 tablet (81 mg) by mouth once daily. Historical Provider, MD  Active   atorvastatin (Lipitor) 40 mg tablet [id number] Yes Take 1 tablet (40 mg) by mouth once daily. Eleanor Downs DO  Active   fluticasone (Flonase) 50 mcg/actuation nasal spray [id number]  Administer 1 spray into each nostril once daily. Shake gently. Before first use, prime pump. After use, clean tip and replace cap. Eleanor Downs DO   10/03/24 8660   HYDROcodone-acetaminophen (Norco) 5-325 mg tablet [id number] Yes Take 1 tablet by mouth every 4 hours if needed for moderate pain (4 - 6). Historical Provider, MD  Active   levothyroxine (Synthroid, Levoxyl) 88 mcg tablet [id number] Yes Take 1 tablet (88 mcg) by mouth once daily. Eleanor Downs DO  Active   lisinopriL-hydrochlorothiazide 10-12.5 mg tablet [id number] Yes Take 1 tablet by mouth once daily. Eleanor Downs DO  Active   metoprolol succinate XL (Toprol-XL) 25 mg 24 hr tablet [id number] Yes Take 1 tablet (25 mg) by mouth once daily. Eleanor Downs DO  Active   oxyCODONE-acetaminophen (Percocet) 5-325 mg tablet [id number]  Take 1 tablet by mouth every 6 hours if needed for severe pain (7 - 10) for up to 12 doses.   Patient not taking: Reported on 2024    Jey CHRISTIAN  MD Myles  Active   venlafaxine XR (Effexor-XR) 150 mg 24 hr capsule [id number] Yes Take 1 capsule (150 mg) by mouth once daily. 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    Hypertension     Leg fracture     Obstructive sleep apnea (adult) (pediatric)     Obstructive sleep apnea, adult    Other specified abnormal findings of blood chemistry 08/11/2021    Elevated liver function tests    Personal history of other diseases of the musculoskeletal system and connective tissue     History of osteoporosis    Personal history of other endocrine, nutritional and metabolic disease     History of hypothyroidism    Personal history of other endocrine, nutritional and metabolic disease     History of hyperlipidemia    Personal history of other specified conditions 07/30/2020    History of abdominal pain    Unspecified fracture of sternum, initial encounter for closed fracture 01/28/2021    Fracture of sternum       Past Surgical History:   Procedure Laterality Date    CATARACT EXTRACTION Bilateral     3/2024    CERVICAL DISC SURGERY      plate in neck, reports full ROM    COLONOSCOPY  01/29/2020    Complete Colonoscopy    OTHER SURGICAL HISTORY  06/09/2021    thumb replacement    OTHER SURGICAL HISTORY  01/29/2020    Wrist surgery       ROS  See HPI    Physical Exam  Vitals and nursing note reviewed.   Constitutional:       Appearance: She is ill-appearing.   HENT:      Head: Normocephalic and atraumatic.      Right Ear: Tympanic membrane and ear canal normal.      Left Ear: Tympanic membrane and ear canal normal.      Nose: Congestion present.      Right Turbinates: Swollen.      Left Turbinates: Swollen.      Right Sinus: Maxillary sinus tenderness present.       Left Sinus: Maxillary sinus tenderness present.      Mouth/Throat:      Mouth: Mucous membranes are moist.      Pharynx: Oropharynx is clear.   Eyes:      Extraocular Movements: Extraocular movements intact.      Conjunctiva/sclera: Conjunctivae normal.      Pupils: Pupils are equal, round, and reactive to light.   Cardiovascular:      Rate and Rhythm: Normal rate.   Pulmonary:      Effort: Pulmonary effort is normal.      Breath sounds: Normal breath sounds.   Skin:     General: Skin is warm and dry.   Neurological:      General: No focal deficit present.      Mental Status: She is alert and oriented to person, place, and time.   Psychiatric:         Mood and Affect: Mood normal.         Behavior: Behavior normal.           Assessment/Plan/MDM  Caron was seen today for uri.  Diagnoses and all orders for this visit:  Acute non-recurrent maxillary sinusitis (Primary)  -     amoxicillin-pot clavulanate (Augmentin) 875-125 mg tablet; Take 1 tablet by mouth 2 times a day for 7 days.  -     predniSONE (Deltasone) 10 mg tablet; Take 3 tablets (30 mg) by mouth once daily for 5 days.    Encouraged pt to use otc cold remedies PRN, push PO fluids and rest. Patient's clinical presentation is otherwise unremarkable at this time. Patient is discharged with instructions to follow-up with primary care or seek emergency medical attention for worsening symptoms or any new concerns.    I did personally review Caron's past medical history, surgical history, social history, as well as family history (when relevant).  In this case, I also oversaw the her drug management by reviewing her medication list, allergy list, as well as the medications that I prescribed during the UC course and/or recommended as an out-patient (including possible OTC medications such as acetaminophen, NSAIDs , etc).    After reviewing the items above, I did look at previous medical documentation, such as recent hospitalizations, office visits, and/or  recent consultations with PCP/specialist.                          SDOH:   Another factor that I considered in Caron's care was her Social Determinants of Health (SDOH). During this UC encounter, she did not have social determinants of health. Those SDOH influencing Caron's care are: none      Jarrett Suarez CNP  [facility]  [phone]

## 2024-12-06 ENCOUNTER — TELEPHONE (OUTPATIENT)
Age: 69
End: 2024-12-06
Payer: MEDICARE

## 2024-12-06 NOTE — TELEPHONE ENCOUNTER
Please advise that I will need to see her to see what is going on.  I do not know if any of the other providers could see her today since I am totally full.  I would be happy to see her on Monday. Thank you

## 2024-12-06 NOTE — TELEPHONE ENCOUNTER
SEEN IN  URGENT CARE MONDAY AM.  GIVEN  PREDNISONE AND AUGMENTIN.   NOT FEELING G ANY BETTER.   SINUS VIKKI.  HEADACHE, DIZZY.  CAN YOU CHANGE MED ?        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 Pt is a 58 y/o M with PMH of Diabetes, Htn (not on meds), obesity, PSH of Left foot 5th digit amputation, who presented to the ED with complaints of chronic left heel ulcer for 2-3 months.

## 2024-12-09 ENCOUNTER — APPOINTMENT (OUTPATIENT)
Dept: RADIOLOGY | Facility: HOSPITAL | Age: 69
End: 2024-12-09
Payer: MEDICARE

## 2024-12-09 ENCOUNTER — HOSPITAL ENCOUNTER (EMERGENCY)
Facility: HOSPITAL | Age: 69
Discharge: HOME | End: 2024-12-09
Attending: EMERGENCY MEDICINE
Payer: MEDICARE

## 2024-12-09 VITALS
BODY MASS INDEX: 27.46 KG/M2 | HEART RATE: 81 BPM | TEMPERATURE: 98.2 F | WEIGHT: 155 LBS | OXYGEN SATURATION: 91 % | SYSTOLIC BLOOD PRESSURE: 104 MMHG | HEIGHT: 63 IN | DIASTOLIC BLOOD PRESSURE: 79 MMHG | RESPIRATION RATE: 16 BRPM

## 2024-12-09 DIAGNOSIS — R51.9 NONINTRACTABLE HEADACHE, UNSPECIFIED CHRONICITY PATTERN, UNSPECIFIED HEADACHE TYPE: Primary | ICD-10-CM

## 2024-12-09 LAB
ALBUMIN SERPL BCP-MCNC: 4.3 G/DL (ref 3.4–5)
ALP SERPL-CCNC: 40 U/L (ref 33–136)
ALT SERPL W P-5'-P-CCNC: 54 U/L (ref 7–45)
ANION GAP SERPL CALC-SCNC: 13 MMOL/L (ref 10–20)
AST SERPL W P-5'-P-CCNC: 32 U/L (ref 9–39)
BILIRUB SERPL-MCNC: 0.5 MG/DL (ref 0–1.2)
BUN SERPL-MCNC: 25 MG/DL (ref 6–23)
CALCIUM SERPL-MCNC: 9.3 MG/DL (ref 8.6–10.3)
CHLORIDE SERPL-SCNC: 104 MMOL/L (ref 98–107)
CO2 SERPL-SCNC: 25 MMOL/L (ref 21–32)
CREAT SERPL-MCNC: 0.81 MG/DL (ref 0.5–1.05)
CRP SERPL-MCNC: <0.1 MG/DL
EGFRCR SERPLBLD CKD-EPI 2021: 78 ML/MIN/1.73M*2
ERYTHROCYTE [DISTWIDTH] IN BLOOD BY AUTOMATED COUNT: 12.8 % (ref 11.5–14.5)
ERYTHROCYTE [SEDIMENTATION RATE] IN BLOOD BY WESTERGREN METHOD: 10 MM/H (ref 0–30)
FLUAV RNA RESP QL NAA+PROBE: NOT DETECTED
FLUBV RNA RESP QL NAA+PROBE: NOT DETECTED
GLUCOSE SERPL-MCNC: 90 MG/DL (ref 74–99)
HCT VFR BLD AUTO: 47.2 % (ref 36–46)
HGB BLD-MCNC: 16.2 G/DL (ref 12–16)
MCH RBC QN AUTO: 31 PG (ref 26–34)
MCHC RBC AUTO-ENTMCNC: 34.3 G/DL (ref 32–36)
MCV RBC AUTO: 90 FL (ref 80–100)
NRBC BLD-RTO: 0 /100 WBCS (ref 0–0)
PLATELET # BLD AUTO: 277 X10*3/UL (ref 150–450)
POTASSIUM SERPL-SCNC: 3.7 MMOL/L (ref 3.5–5.3)
PROT SERPL-MCNC: 6.7 G/DL (ref 6.4–8.2)
RBC # BLD AUTO: 5.23 X10*6/UL (ref 4–5.2)
SARS-COV-2 RNA RESP QL NAA+PROBE: NOT DETECTED
SODIUM SERPL-SCNC: 138 MMOL/L (ref 136–145)
WBC # BLD AUTO: 8.2 X10*3/UL (ref 4.4–11.3)

## 2024-12-09 PROCEDURE — 87636 SARSCOV2 & INF A&B AMP PRB: CPT | Performed by: PHYSICIAN ASSISTANT

## 2024-12-09 PROCEDURE — 99284 EMERGENCY DEPT VISIT MOD MDM: CPT | Mod: 25 | Performed by: EMERGENCY MEDICINE

## 2024-12-09 PROCEDURE — 86140 C-REACTIVE PROTEIN: CPT | Performed by: PHYSICIAN ASSISTANT

## 2024-12-09 PROCEDURE — 70450 CT HEAD/BRAIN W/O DYE: CPT | Performed by: STUDENT IN AN ORGANIZED HEALTH CARE EDUCATION/TRAINING PROGRAM

## 2024-12-09 PROCEDURE — 85027 COMPLETE CBC AUTOMATED: CPT | Performed by: PHYSICIAN ASSISTANT

## 2024-12-09 PROCEDURE — 2500000004 HC RX 250 GENERAL PHARMACY W/ HCPCS (ALT 636 FOR OP/ED): Performed by: PHYSICIAN ASSISTANT

## 2024-12-09 PROCEDURE — 70450 CT HEAD/BRAIN W/O DYE: CPT

## 2024-12-09 PROCEDURE — 96375 TX/PRO/DX INJ NEW DRUG ADDON: CPT

## 2024-12-09 PROCEDURE — 85652 RBC SED RATE AUTOMATED: CPT | Performed by: PHYSICIAN ASSISTANT

## 2024-12-09 PROCEDURE — 80053 COMPREHEN METABOLIC PANEL: CPT | Performed by: PHYSICIAN ASSISTANT

## 2024-12-09 PROCEDURE — 84075 ASSAY ALKALINE PHOSPHATASE: CPT | Performed by: PHYSICIAN ASSISTANT

## 2024-12-09 PROCEDURE — 36415 COLL VENOUS BLD VENIPUNCTURE: CPT | Performed by: PHYSICIAN ASSISTANT

## 2024-12-09 PROCEDURE — 96374 THER/PROPH/DIAG INJ IV PUSH: CPT

## 2024-12-09 RX ORDER — ONDANSETRON HYDROCHLORIDE 2 MG/ML
4 INJECTION, SOLUTION INTRAVENOUS ONCE
Status: COMPLETED | OUTPATIENT
Start: 2024-12-09 | End: 2024-12-09

## 2024-12-09 RX ORDER — IBUPROFEN 600 MG/1
600 TABLET ORAL EVERY 6 HOURS PRN
Qty: 30 TABLET | Refills: 0 | Status: SHIPPED | OUTPATIENT
Start: 2024-12-09

## 2024-12-09 RX ORDER — METOCLOPRAMIDE 10 MG/1
10 TABLET ORAL EVERY 6 HOURS PRN
Qty: 12 TABLET | Refills: 0 | Status: SHIPPED | OUTPATIENT
Start: 2024-12-09

## 2024-12-09 RX ORDER — KETOROLAC TROMETHAMINE 30 MG/ML
15 INJECTION, SOLUTION INTRAMUSCULAR; INTRAVENOUS ONCE
Status: COMPLETED | OUTPATIENT
Start: 2024-12-09 | End: 2024-12-09

## 2024-12-09 RX ORDER — DIPHENHYDRAMINE HYDROCHLORIDE 50 MG/ML
12.5 INJECTION INTRAMUSCULAR; INTRAVENOUS ONCE
Status: COMPLETED | OUTPATIENT
Start: 2024-12-09 | End: 2024-12-09

## 2024-12-09 RX ORDER — METOCLOPRAMIDE HYDROCHLORIDE 5 MG/ML
10 INJECTION INTRAMUSCULAR; INTRAVENOUS ONCE
Status: COMPLETED | OUTPATIENT
Start: 2024-12-09 | End: 2024-12-09

## 2024-12-09 ASSESSMENT — COLUMBIA-SUICIDE SEVERITY RATING SCALE - C-SSRS
6. HAVE YOU EVER DONE ANYTHING, STARTED TO DO ANYTHING, OR PREPARED TO DO ANYTHING TO END YOUR LIFE?: NO
2. HAVE YOU ACTUALLY HAD ANY THOUGHTS OF KILLING YOURSELF?: NO
1. IN THE PAST MONTH, HAVE YOU WISHED YOU WERE DEAD OR WISHED YOU COULD GO TO SLEEP AND NOT WAKE UP?: NO

## 2024-12-09 ASSESSMENT — PAIN DESCRIPTION - LOCATION
LOCATION: HEAD

## 2024-12-09 ASSESSMENT — PAIN SCALES - GENERAL
PAINLEVEL_OUTOF10: 10 - WORST POSSIBLE PAIN
PAINLEVEL_OUTOF10: 7
PAINLEVEL_OUTOF10: 1

## 2024-12-09 ASSESSMENT — PAIN - FUNCTIONAL ASSESSMENT
PAIN_FUNCTIONAL_ASSESSMENT: 0-10
PAIN_FUNCTIONAL_ASSESSMENT: 0-10

## 2024-12-09 ASSESSMENT — PAIN DESCRIPTION - PAIN TYPE
TYPE: ACUTE PAIN
TYPE: ACUTE PAIN

## 2024-12-09 NOTE — ED PROVIDER NOTES
HPI   Chief Complaint   Patient presents with    Headache     Pt reports headache, dizziness and weakness for 17 days. Pt reports she's been unable to get out of bed. Reports vision changes/difficulty.       Patient presents with headache.  States has been ongoing for 17 days now.  States it started and has been unchanged for the past 17 days.  She states she cannot get any relief.  She went to an urgent care and was placed on Augmentin and steroids without any change in her symptoms.  She states her headache is in the front as well as the side of her head and the back.  States she did have chronic headaches for a long period of time that would last 2 to 3 days before she had neck surgery.  She states after her neck surgery she has not had any headaches since.  Her typical headache at that time had been on the sides of her head and in the back.  She states this is similar today.  She also has added frontal facial discomfort and she describes it as sinus pressure.  She denies any nasal drainage cough or sore throat.  No fever or chills.  Family members present who denies any symptoms himself.  Patient states she does get dizzy with a spinning sensation when she moves her head around.  She states lights and noises do make the headache worse.  Besides the medications given by the urgent care she is also trialed Benadryl without improvement.  Patient denies any weakness to her extremities.  No facial droop.  No slurred speech.      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    Hypertension     Leg fracture     Obstructive sleep apnea (adult) (pediatric)     Obstructive sleep apnea, adult    Other specified abnormal findings of blood chemistry  2021    Elevated liver function tests    Personal history of other diseases of the musculoskeletal system and connective tissue     History of osteoporosis    Personal history of other endocrine, nutritional and metabolic disease     History of hypothyroidism    Personal history of other endocrine, nutritional and metabolic disease     History of hyperlipidemia    Personal history of other specified conditions 2020    History of abdominal pain    Unspecified fracture of sternum, initial encounter for closed fracture 2021    Fracture of sternum     Past Surgical History:   Procedure Laterality Date    CATARACT EXTRACTION Bilateral     3/2024    CERVICAL DISC SURGERY      plate in neck, reports full ROM    COLONOSCOPY  2020    Complete Colonoscopy    OTHER SURGICAL HISTORY  2021    thumb replacement    OTHER SURGICAL HISTORY  2020    Wrist surgery     No family history on file.  Social History     Tobacco Use    Smoking status: Former     Current packs/day: 0.00     Types: Cigarettes     Quit date:      Years since quittin.9    Smokeless tobacco: Never   Vaping Use    Vaping status: Never Used   Substance Use Topics    Alcohol use: Yes     Comment: rarely    Drug use: Not Currently       Physical Exam   ED Triage Vitals [24 1009]   Temperature Heart Rate Respirations BP   36.8 °C (98.2 °F) 85 18 (!) 147/102      Pulse Ox Temp Source Heart Rate Source Patient Position   (!) 93 % Temporal -- --      BP Location FiO2 (%)     -- --       Physical Exam  Vitals and nursing note reviewed.   Constitutional:       General: She is not in acute distress.     Appearance: Normal appearance. She is well-developed, well-groomed and normal weight. She is not ill-appearing or toxic-appearing.   HENT:      Head: Normocephalic.      Right Ear: Tympanic membrane, ear canal and external ear normal.      Left Ear: Tympanic membrane, ear canal and external ear normal.      Nose: Nose  normal.      Mouth/Throat:      Lips: Pink. No lesions.      Mouth: Mucous membranes are moist.      Pharynx: Oropharynx is clear. Uvula midline.   Eyes:      General: No scleral icterus.     Extraocular Movements:      Right eye: No nystagmus.      Left eye: No nystagmus.      Conjunctiva/sclera: Conjunctivae normal.      Pupils: Pupils are equal.   Neck:      Meningeal: Kernig's sign absent.   Cardiovascular:      Rate and Rhythm: Normal rate and regular rhythm.      Pulses:           Dorsalis pedis pulses are 2+ on the right side and 2+ on the left side.        Posterior tibial pulses are 2+ on the right side and 2+ on the left side.      Heart sounds: Normal heart sounds.   Pulmonary:      Effort: Pulmonary effort is normal.      Breath sounds: Normal breath sounds and air entry.   Abdominal:      General: Bowel sounds are normal.   Musculoskeletal:         General: No swelling or tenderness.      Cervical back: No spinous process tenderness or muscular tenderness.      Right lower leg: No edema.      Left lower leg: No edema.   Lymphadenopathy:      Cervical: No cervical adenopathy.   Skin:     General: Skin is warm.      Capillary Refill: Capillary refill takes less than 2 seconds.   Neurological:      Mental Status: She is alert and oriented to person, place, and time.      Cranial Nerves: No cranial nerve deficit or facial asymmetry.      Motor: No pronator drift.      Coordination: Finger-Nose-Finger Test normal.      Gait: Gait normal.      Comments: Gait observed as normal   Psychiatric:         Attention and Perception: Perception normal.         Mood and Affect: Mood and affect normal.         Speech: Speech normal.         Behavior: Behavior normal. Behavior is cooperative.         Thought Content: Thought content normal.         Cognition and Memory: Cognition and memory normal.         Judgment: Judgment normal.           ED Course & MDM   ED Course as of 12/09/24 1257   Mon Dec 09, 2024   1108 BP:  139/91 []      ED Course User Index  [LH] Francisca Cadena PA-C         Diagnoses as of 12/09/24 1257   Nonintractable headache, unspecified chronicity pattern, unspecified headache type                 No data recorded     Marco Coma Scale Score: 15 (12/09/24 1025 : Malini Arias, JOSE DAVID)                           Medical Decision Making  Patient presents with headache.  States has been ongoing for 17 days now.  States it started and has been unchanged for the past 17 days.  She states she cannot get any relief.  She went to an urgent care and was placed on Augmentin and steroids without any change in her symptoms.  She states her headache is in the front as well as the side of her head and the back.  States she did have chronic headaches for a long period of time that would last 2 to 3 days before she had neck surgery.  She states after her neck surgery she has not had any headaches since.  Her typical headache at that time had been on the sides of her head and in the back.  She states this is similar today.  She also has added frontal facial discomfort and she describes it as sinus pressure.  She denies any nasal drainage cough or sore throat.  No fever or chills.  Family members present who denies any symptoms himself.  Patient states she does get dizzy with a spinning sensation when she moves her head around.  She states lights and noises do make the headache worse.  Besides the medications given by the urgent care she is also trialed Benadryl without improvement.  Patient denies any weakness to her extremities.  No facial droop.  No slurred speech.    Ddx: Temporal arteritis, sinusitis, migraine, COVID, influenza, CVA, other   patient's pain was treated with Dilaudid and Zofran IV.  Will obtain labs and CT.  No acute findings were noted.  Patient only got minimal improvement with the Dilaudid.  Therefore after CT came back showing no acute intracranial bleeding Toradol Reglan and Benadryl were given.  Patient then  had significant treatment of her headache down to a 1 out of 10.  She states she now feels a little bit tired which is not uncommon with the medications that we have given her today.  Patient can be safely discharged home to continue follow-up with primary care provider and/or neurology that she seen in the past for her headaches.  Will give her prescriptions for Reglan and Motrin if she has any additional pain or nausea.  Patient discharged home in improved stable condition      Problems Addressed:  Nonintractable headache, unspecified chronicity pattern, unspecified headache type: undiagnosed new problem with uncertain prognosis    Amount and/or Complexity of Data Reviewed  Labs: ordered. Decision-making details documented in ED Course.  Radiology: ordered and independent interpretation performed. Decision-making details documented in ED Course.    Risk  Diagnosis or treatment significantly limited by social determinants of health.        Procedure  Procedures     Francisca Cadena PA-C  12/09/24 1257

## 2025-01-06 ENCOUNTER — OFFICE VISIT (OUTPATIENT)
Dept: URGENT CARE | Facility: CLINIC | Age: 70
End: 2025-01-06
Payer: MEDICARE

## 2025-01-06 VITALS
OXYGEN SATURATION: 95 % | BODY MASS INDEX: 26.58 KG/M2 | RESPIRATION RATE: 14 BRPM | SYSTOLIC BLOOD PRESSURE: 117 MMHG | DIASTOLIC BLOOD PRESSURE: 84 MMHG | HEART RATE: 95 BPM | TEMPERATURE: 97.5 F | HEIGHT: 63 IN | WEIGHT: 150 LBS

## 2025-01-06 DIAGNOSIS — R42 DIZZINESS: ICD-10-CM

## 2025-01-06 DIAGNOSIS — L98.9 SKIN ERUPTION RESEMBLING PSORIASIS: Primary | ICD-10-CM

## 2025-01-06 PROCEDURE — 99213 OFFICE O/P EST LOW 20 MIN: CPT | Performed by: NURSE PRACTITIONER

## 2025-01-06 RX ORDER — TRIAMCINOLONE ACETONIDE 1 MG/G
CREAM TOPICAL 2 TIMES DAILY
Qty: 15 G | Refills: 0 | Status: SHIPPED | OUTPATIENT
Start: 2025-01-06

## 2025-01-06 NOTE — PROGRESS NOTES
69 y.o. female presents for evaluation of ear fullness, dizziness and headache that began 2 months ago. Dizziness is worse with turning head quickly. States she has had relief of her headache with migraine cocktail given in emergency dept on 2024. She had a normal CT of her head at that visit as well. Denies drainage from ears, fever, nasal congestion, n/v/d, body aches, chest pains, SOB, or any other associated symptoms. No otc meds for symptoms.      Vitals:    25 1246   BP: 117/84   Pulse: 95   Resp: 14   Temp: 36.4 °C (97.5 °F)   SpO2: 95%       No Known Allergies    Medication Documentation Review Audit       Reviewed by Meghann Parsons MA (Medical Assistant) on 25 at 1244      Medication Order Taking? Sig Documenting Provider Last Dose Status   aspirin (Aspir-81) 81 mg EC tablet [id number] Yes Take 1 tablet (81 mg) by mouth once daily. Historical Provider, MD  Active   atorvastatin (Lipitor) 40 mg tablet [id number] Yes Take 1 tablet (40 mg) by mouth once daily. Eleanor Downs DO  Active   fluticasone (Flonase) 50 mcg/actuation nasal spray [id number]  Administer 1 spray into each nostril once daily. Shake gently. Before first use, prime pump. After use, clean tip and replace cap. Eleanor Downs DO   10/03/24 1797   HYDROcodone-acetaminophen (Norco) 5-325 mg tablet [id number] Yes Take 1 tablet by mouth every 4 hours if needed for moderate pain (4 - 6). Historical Provider, MD  Active   ibuprofen 600 mg tablet [id number] Yes Take 1 tablet (600 mg) by mouth every 6 hours if needed for moderate pain (4 - 6) or mild pain (1 - 3) for up to 30 doses. Francisca Cadena PA-C  Active   levothyroxine (Synthroid, Levoxyl) 88 mcg tablet [id number] Yes Take 1 tablet (88 mcg) by mouth once daily. Eleanor Downs DO  Active   lisinopriL-hydrochlorothiazide 10-12.5 mg tablet [id number] Yes Take 1 tablet by mouth once daily. Eleanor Downs DO  Active   metoclopramide (Reglan) 10 mg tablet [id number] Yes  Take 1 tablet (10 mg) by mouth every 6 hours if needed (nausea) for up to 12 doses. Francisca Cadena PA-C  Active   metoprolol succinate XL (Toprol-XL) 25 mg 24 hr tablet [id number] Yes Take 1 tablet (25 mg) by mouth once daily. Eleanor Downs DO  Active   oxyCODONE-acetaminophen (Percocet) 5-325 mg tablet [id number]  Take 1 tablet by mouth every 6 hours if needed for severe pain (7 - 10) for up to 12 doses.   Patient not taking: Reported on 1/6/2025    Jey Bueno MD  Active   venlafaxine XR (Effexor-XR) 150 mg 24 hr capsule [id number] Yes Take 1 capsule (150 mg) by mouth once daily. 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    Hypertension     Leg fracture     Obstructive sleep apnea (adult) (pediatric)     Obstructive sleep apnea, adult    Other specified abnormal findings of blood chemistry 08/11/2021    Elevated liver function tests    Personal history of other diseases of the musculoskeletal system and connective tissue     History of osteoporosis    Personal history of other endocrine, nutritional and metabolic disease     History of hypothyroidism    Personal history of other endocrine, nutritional and metabolic disease     History of hyperlipidemia    Personal history of other specified conditions 07/30/2020    History of abdominal pain    Unspecified fracture of sternum, initial encounter for closed fracture 01/28/2021    Fracture of sternum       Past Surgical History:   Procedure Laterality Date    CATARACT EXTRACTION Bilateral     3/2024    CERVICAL DISC SURGERY      plate in neck, reports full ROM    COLONOSCOPY  01/29/2020    Complete Colonoscopy    OTHER SURGICAL HISTORY  06/09/2021    thumb replacement    OTHER SURGICAL HISTORY   01/29/2020    Wrist surgery       ROS  See HPI    Physical Exam  Vitals and nursing note reviewed.   Constitutional:       General: She is not in acute distress.     Appearance: Normal appearance. She is not ill-appearing, toxic-appearing or diaphoretic.   HENT:      Head: Normocephalic and atraumatic.      Right Ear: Tympanic membrane and ear canal normal. Tenderness (to EAC, scaly rash resmbling psoriasis to EAC) present. No drainage or swelling. No middle ear effusion. There is no impacted cerumen. No mastoid tenderness. Tympanic membrane is not perforated, erythematous, retracted or bulging.      Left Ear: Tympanic membrane and ear canal normal. Tenderness (to EAC, scaly rash resmbling psoriasis to EAC) present. No drainage or swelling.  No middle ear effusion. There is no impacted cerumen. No mastoid tenderness. Tympanic membrane is not perforated, erythematous, retracted or bulging.      Nose: Nose normal.      Mouth/Throat:      Mouth: Mucous membranes are moist.      Pharynx: Oropharynx is clear.   Eyes:      Conjunctiva/sclera: Conjunctivae normal.      Pupils: Pupils are equal, round, and reactive to light.   Cardiovascular:      Rate and Rhythm: Normal rate.   Pulmonary:      Effort: Pulmonary effort is normal.      Breath sounds: Normal breath sounds.   Lymphadenopathy:      Cervical: No cervical adenopathy.   Skin:     General: Skin is warm and dry.   Neurological:      General: No focal deficit present.      Mental Status: She is alert and oriented to person, place, and time.   Psychiatric:         Mood and Affect: Mood normal.         Behavior: Behavior normal.           Assessment/Plan/MDM  Caron was seen today for earache.  Diagnoses and all orders for this visit:  Skin eruption resembling psoriasis (Primary)  -     triamcinolone (Kenalog) 0.1 % cream; Apply topically 2 times a day.  Dizziness    Discussed Epley's maneuvers with patient and provided educational resources on them. Encouraged her to  follow up with PCP and neurology as scheduled.  Patient's clinical presentation is otherwise unremarkable at this time. Patient is discharged with instructions to follow-up with primary care or seek emergency medical attention for worsening symptoms or any new concerns.    I did personally review Caron's past medical history, surgical history, social history, as well as family history (when relevant).  In this case, I also oversaw the her drug management by reviewing her medication list, allergy list, as well as the medications that I prescribed during the UC course and/or recommended as an out-patient (including possible OTC medications such as acetaminophen, NSAIDs , etc).    After reviewing the items above, I did look at previous medical documentation, such as recent hospitalizations, office visits, and/or recent consultations with PCP/specialist.                          SDOH:   Another factor that I considered in Caron's care was her Social Determinants of Health (SDOH). During this UC encounter, she did not have social determinants of health. Those SDOH influencing Caron's care are: none      Jarrett Suarez CNP  [facility]  [phone]

## 2025-02-10 DIAGNOSIS — I15.9 SECONDARY HYPERTENSION: ICD-10-CM

## 2025-02-11 DIAGNOSIS — F41.8 ANXIETY WITH DEPRESSION: ICD-10-CM

## 2025-02-11 DIAGNOSIS — R09.82 PND (POST-NASAL DRIP): ICD-10-CM

## 2025-02-21 RX ORDER — FLUTICASONE PROPIONATE 50 MCG
1 SPRAY, SUSPENSION (ML) NASAL DAILY
Qty: 16 G | Refills: 5 | Status: SHIPPED | OUTPATIENT
Start: 2025-02-21 | End: 2026-02-21

## 2025-02-21 RX ORDER — VENLAFAXINE HYDROCHLORIDE 150 MG/1
150 CAPSULE, EXTENDED RELEASE ORAL DAILY
Qty: 90 CAPSULE | Refills: 3 | Status: SHIPPED | OUTPATIENT
Start: 2025-02-21

## 2025-03-07 ENCOUNTER — HOSPITAL ENCOUNTER (OUTPATIENT)
Dept: HOSPITAL 100 - MRI | Age: 70
Discharge: HOME | End: 2025-03-07
Payer: MEDICARE

## 2025-03-07 DIAGNOSIS — Z00.00: Primary | ICD-10-CM

## 2025-04-02 ENCOUNTER — TELEPHONE (OUTPATIENT)
Age: 70
End: 2025-04-02

## 2025-04-02 ENCOUNTER — APPOINTMENT (OUTPATIENT)
Age: 70
End: 2025-04-02
Payer: MEDICARE

## 2025-04-02 VITALS
BODY MASS INDEX: 26.93 KG/M2 | WEIGHT: 152 LBS | HEIGHT: 63 IN | OXYGEN SATURATION: 98 % | SYSTOLIC BLOOD PRESSURE: 128 MMHG | DIASTOLIC BLOOD PRESSURE: 70 MMHG | HEART RATE: 75 BPM

## 2025-04-02 DIAGNOSIS — R69 TAKING DRUG FOR CHRONIC DISEASE: ICD-10-CM

## 2025-04-02 DIAGNOSIS — G43.E09 CHRONIC MIGRAINE WITH AURA WITHOUT STATUS MIGRAINOSUS, NOT INTRACTABLE: ICD-10-CM

## 2025-04-02 DIAGNOSIS — J45.20 MILD INTERMITTENT ASTHMA WITHOUT COMPLICATION (HHS-HCC): ICD-10-CM

## 2025-04-02 DIAGNOSIS — I15.9 SECONDARY HYPERTENSION: ICD-10-CM

## 2025-04-02 DIAGNOSIS — E03.9 ACQUIRED HYPOTHYROIDISM: ICD-10-CM

## 2025-04-02 DIAGNOSIS — Z79.1 NSAID LONG-TERM USE: ICD-10-CM

## 2025-04-02 DIAGNOSIS — G47.09 OTHER INSOMNIA: ICD-10-CM

## 2025-04-02 DIAGNOSIS — E04.1 THYROID NODULE: ICD-10-CM

## 2025-04-02 DIAGNOSIS — Z12.31 ENCOUNTER FOR SCREENING MAMMOGRAM FOR MALIGNANT NEOPLASM OF BREAST: Primary | ICD-10-CM

## 2025-04-02 DIAGNOSIS — M81.0 AGE RELATED OSTEOPOROSIS, UNSPECIFIED PATHOLOGICAL FRACTURE PRESENCE: ICD-10-CM

## 2025-04-02 PROCEDURE — 99214 OFFICE O/P EST MOD 30 MIN: CPT | Performed by: INTERNAL MEDICINE

## 2025-04-02 PROCEDURE — G2211 COMPLEX E/M VISIT ADD ON: HCPCS | Performed by: INTERNAL MEDICINE

## 2025-04-02 PROCEDURE — 3074F SYST BP LT 130 MM HG: CPT | Performed by: INTERNAL MEDICINE

## 2025-04-02 PROCEDURE — 3078F DIAST BP <80 MM HG: CPT | Performed by: INTERNAL MEDICINE

## 2025-04-02 PROCEDURE — 3008F BODY MASS INDEX DOCD: CPT | Performed by: INTERNAL MEDICINE

## 2025-04-02 PROCEDURE — 1159F MED LIST DOCD IN RCRD: CPT | Performed by: INTERNAL MEDICINE

## 2025-04-02 RX ORDER — TRAZODONE HYDROCHLORIDE 50 MG/1
50 TABLET ORAL NIGHTLY PRN
Qty: 90 TABLET | Refills: 1 | Status: SHIPPED | OUTPATIENT
Start: 2025-04-02 | End: 2026-04-02

## 2025-04-02 RX ORDER — TRAZODONE HYDROCHLORIDE 50 MG/1
50 TABLET ORAL NIGHTLY PRN
Qty: 30 TABLET | Refills: 2 | Status: SHIPPED | OUTPATIENT
Start: 2025-04-02 | End: 2025-04-02 | Stop reason: SDUPTHER

## 2025-04-02 RX ORDER — TRAZODONE HYDROCHLORIDE 50 MG/1
50 TABLET ORAL NIGHTLY PRN
Qty: 90 TABLET | Refills: 1 | Status: SHIPPED | OUTPATIENT
Start: 2025-04-02 | End: 2025-04-02

## 2025-04-02 ASSESSMENT — ENCOUNTER SYMPTOMS
DIARRHEA: 0
VOMITING: 0
PALPITATIONS: 0
CHEST TIGHTNESS: 0
WHEEZING: 0
NECK PAIN: 1
FATIGUE: 1
NAUSEA: 0
COUGH: 0
ARTHRALGIAS: 0
SHORTNESS OF BREATH: 0
BACK PAIN: 0
ABDOMINAL PAIN: 0
BLOOD IN STOOL: 0

## 2025-04-02 NOTE — TELEPHONE ENCOUNTER
Completed. Thanks   O-Z Flap Text: The defect edges were debeveled with a #15 scalpel blade.  Given the location of the defect, shape of the defect and the proximity to free margins an O-Z flap was deemed most appropriate.  Using a sterile surgical marker, an appropriate transposition flap was drawn incorporating the defect and placing the expected incisions within the relaxed skin tension lines where possible. The area thus outlined was incised deep to adipose tissue with a #15 scalpel blade.  The skin margins were undermined to an appropriate distance in all directions utilizing iris scissors.

## 2025-04-02 NOTE — PATIENT INSTRUCTIONS
Patient instructions  As we discussed I would be happy to prescribe the Qulipta since you have had success with it in the past for your migraines.  It comes in 10, 30, and 60 mg.  There is also a generic.  Please call us with what you have been taking in the past so we can provide a refill  Please also talk to your spine doctor about being referred to the pain clinic.  I think that would be very helpful to you.  Please remember our conversation about using high-dose ibuprofen.  Ibuprofen is an NSAID.  Examples of NSAIDs are ibuprofen, Motrin, Aleve, Advil, Naprosyn, naproxen, and high-dose aspirin.  These drugs are wonderful for short-term use but when used for long-term it does significantly increase your risk of complications which might include gastritis, bleeding stomach ulcer, kidney failure, fluid retention, elevation of blood pressure, and also increase your risk of a heart attack.  I also sent a prescription for your trazodone and please let us know if you are having problems with this medicine  Please remember to schedule your mammogram at your earliest convenience  Please remember to follow-up with endocrinology because of all of your endocrine concerns

## 2025-04-02 NOTE — ASSESSMENT & PLAN NOTE
-She is aware that she is due for her screening mammogram and she will try to get that scheduled at her earliest convenience

## 2025-04-02 NOTE — PROGRESS NOTES
Subjective   Patient ID: Caron Marc is a 69 y.o. female who presents for Follow-up (6 MO CK).  HPI  She is here today for a routine 6-month checkup.  She states she has not been doing well in recent times.  She states she has been having severe problems with her neck pain.  We are reminded that she has had surgery in the past.  She is working with a pain specialist in [place] who she states recently retired.  She states she is working with another pain specialist and they wanted her to have an MRI.  She states she tried an open MRI but she was simply too claustrophobic and could not go through the test.  She states that they are making arrangements for her to have an MRI in [place] under heavy sedation.  She is waiting to hear back.  She also has a history of migraine headaches and we are sure that the neck issues are affecting her headaches.  She explains that she has tried various medications that have been ineffective but she remembers taking Qulipta and it worked very well.  I told her it was certainly agreeable to providing a prescription for that medicine.  We talked about the various doses and she is going to check to see what she was on before and call me.  She also states she has been taking a lot of ibuprofen and I told her that she needs to be careful because the ibuprofen could cause serious side effects.  I will have her get some lab work to make sure that her blood count and kidney function is okay.  She also states she had some leftover Percocet and has been taking 1 a day.  We discussed how this is a narcotic and is not something that I can provide for her in the future.  She is also having difficulty sleeping and would like to go back on the trazodone.  I certainly can send a prescription in for that.  She is also due for screening mammogram and we will help her get that scheduled.  Also she has been diagnosed as having mild intermittent asthma but she has been asymptomatic for a long  time.  She is also following with an endocrinologist for her thyroid concerns but she states she has had difficulties getting in for a timely follow-up and she is thinking about switching to her daughter's endocrinologist.  She will let us know.  Review of Systems   Constitutional:  Positive for fatigue.   Respiratory:  Negative for cough, chest tightness, shortness of breath and wheezing.    Cardiovascular:  Negative for chest pain, palpitations and leg swelling.   Gastrointestinal:  Negative for abdominal pain, blood in stool, diarrhea, nausea and vomiting.   Musculoskeletal:  Positive for neck pain. Negative for arthralgias and back pain.     Objective   Physical Exam  Vitals and nursing note reviewed.   Constitutional:       General: She is not in acute distress.     Appearance: Normal appearance.   HENT:      Head: Normocephalic and atraumatic.   Eyes:      Conjunctiva/sclera: Conjunctivae normal.   Cardiovascular:      Rate and Rhythm: Normal rate and regular rhythm.      Heart sounds: Normal heart sounds.   Pulmonary:      Effort: No respiratory distress.      Breath sounds: No wheezing.   Abdominal:      Palpations: Abdomen is soft.      Tenderness: There is no abdominal tenderness. There is no guarding.   Musculoskeletal:         General: No swelling. Normal range of motion.   Skin:     General: Skin is warm and dry.   Neurological:      General: No focal deficit present.      Mental Status: She is alert and oriented to person, place, and time.   Psychiatric:         Behavior: Behavior normal.       Assessment/Plan   Problem List Items Addressed This Visit             ICD-10-CM    Encounter for screening mammogram for malignant neoplasm of breast - Primary Z12.31     -She is aware that she is due for her screening mammogram and she will try to get that scheduled at her earliest convenience         Relevant Orders    BI mammo bilateral screening tomosynthesis    Hypertension I10     -Her blood pressure  appears to be well-controlled at this time and we will continue to monitor         Thyroid nodule E04.1    Taking drug for chronic disease R69     -We discussed being very cautious about taking day-to-day ibuprofen and she will go for lab work at her earliest convenience.  I have agreed to contact her with results         Relevant Orders    CBC and Auto Differential    Comprehensive Metabolic Panel    Osteoporosis M81.0     -She also received IV Reclast from her endocrinologist         Mild intermittent asthma without complication ([facility]) J45.20     -She is asymptomatic at this time we will continue to monitor         Migraine headache G43.909     -I have agreed to give her a prescription for Qulipta and she will call with the dose that she successfully took in the past  -We also feel that part of her migraine headaches are being triggered by her chronic neck pain  -She states she is thinking about trying over-the-counter magnesium supplement and I told her that would be fine         Insomnia G47.00     -She is suffering from insomnia again and would like to go back on trazodone         Relevant Medications    traZODone (Desyrel) 50 mg tablet    Acquired hypothyroidism E03.9     -She has been following with endocrinology and her last TSH was performed on October 1 which was completely normal at 2.84          Other Visit Diagnoses         Codes    NSAID long-term use     Z79.1    Relevant Orders    CBC and Auto Differential        Patient instructions  As we discussed I would be happy to prescribe the Qulipta since you have had success with it in the past for your migraines.  It comes in 10, 30, and 60 mg.  There is also a generic.  Please call us with what you have been taking in the past so we can provide a refill  Please also talk to your spine doctor about being referred to the pain clinic.  I think that would be very helpful to you.  Please remember our conversation about using high-dose ibuprofen.  Ibuprofen  is an NSAID.  Examples of NSAIDs are ibuprofen, Motrin, Aleve, Advil, Naprosyn, naproxen, and high-dose aspirin.  These drugs are wonderful for short-term use but when used for long-term it does significantly increase your risk of complications which might include gastritis, bleeding stomach ulcer, kidney failure, fluid retention, elevation of blood pressure, and also increase your risk of a heart attack.  I also sent a prescription for your trazodone and please let us know if you are having problems with this medicine  Please remember to schedule your mammogram at your earliest convenience  Please remember to follow-up with endocrinology because of all of your endocrine concerns       Eleanor Downs, DO

## 2025-04-02 NOTE — ASSESSMENT & PLAN NOTE
-She has been following with endocrinology and her last TSH was performed on October 1 which was completely normal at 2.84

## 2025-04-02 NOTE — ASSESSMENT & PLAN NOTE
-We discussed being very cautious about taking day-to-day ibuprofen and she will go for lab work at her earliest convenience.  I have agreed to contact her with results

## 2025-04-02 NOTE — ASSESSMENT & PLAN NOTE
-I have agreed to give her a prescription for Qulipta and she will call with the dose that she successfully took in the past  -We also feel that part of her migraine headaches are being triggered by her chronic neck pain  -She states she is thinking about trying over-the-counter magnesium supplement and I told her that would be fine

## 2025-04-04 ENCOUNTER — HOSPITAL ENCOUNTER (OUTPATIENT)
Dept: RADIOLOGY | Facility: CLINIC | Age: 70
Discharge: HOME | End: 2025-04-04
Payer: MEDICARE

## 2025-04-04 VITALS — BODY MASS INDEX: 26.93 KG/M2 | HEIGHT: 63 IN | WEIGHT: 152 LBS

## 2025-04-04 DIAGNOSIS — Z12.31 ENCOUNTER FOR SCREENING MAMMOGRAM FOR MALIGNANT NEOPLASM OF BREAST: ICD-10-CM

## 2025-04-04 PROCEDURE — 77063 BREAST TOMOSYNTHESIS BI: CPT

## 2025-04-08 LAB
ALBUMIN SERPL-MCNC: 4.7 G/DL (ref 3.6–5.1)
ALP SERPL-CCNC: 40 U/L (ref 37–153)
ALT SERPL-CCNC: 25 U/L (ref 6–29)
ANION GAP SERPL CALCULATED.4IONS-SCNC: 9 MMOL/L (CALC) (ref 7–17)
AST SERPL-CCNC: 22 U/L (ref 10–35)
BASOPHILS # BLD AUTO: 124 CELLS/UL (ref 0–200)
BASOPHILS NFR BLD AUTO: 2.1 %
BILIRUB SERPL-MCNC: 0.7 MG/DL (ref 0.2–1.2)
BUN SERPL-MCNC: 17 MG/DL (ref 7–25)
CALCIUM SERPL-MCNC: 9.7 MG/DL (ref 8.6–10.4)
CHLORIDE SERPL-SCNC: 101 MMOL/L (ref 98–110)
CO2 SERPL-SCNC: 30 MMOL/L (ref 20–32)
CREAT SERPL-MCNC: 0.76 MG/DL (ref 0.6–1)
EGFRCR SERPLBLD CKD-EPI 2021: 84 ML/MIN/1.73M2
EOSINOPHIL # BLD AUTO: 201 CELLS/UL (ref 15–500)
EOSINOPHIL NFR BLD AUTO: 3.4 %
ERYTHROCYTE [DISTWIDTH] IN BLOOD BY AUTOMATED COUNT: 13 % (ref 11–15)
GLUCOSE SERPL-MCNC: 108 MG/DL (ref 65–99)
HCT VFR BLD AUTO: 46 % (ref 35–45)
HGB BLD-MCNC: 15.2 G/DL (ref 11.7–15.5)
LYMPHOCYTES # BLD AUTO: 2006 CELLS/UL (ref 850–3900)
LYMPHOCYTES NFR BLD AUTO: 34 %
MCH RBC QN AUTO: 30.3 PG (ref 27–33)
MCHC RBC AUTO-ENTMCNC: 33 G/DL (ref 32–36)
MCV RBC AUTO: 91.6 FL (ref 80–100)
MONOCYTES # BLD AUTO: 625 CELLS/UL (ref 200–950)
MONOCYTES NFR BLD AUTO: 10.6 %
NEUTROPHILS # BLD AUTO: 2944 CELLS/UL (ref 1500–7800)
NEUTROPHILS NFR BLD AUTO: 49.9 %
PLATELET # BLD AUTO: 212 THOUSAND/UL (ref 140–400)
PMV BLD REES-ECKER: 12.5 FL (ref 7.5–12.5)
POTASSIUM SERPL-SCNC: 3.8 MMOL/L (ref 3.5–5.3)
PROT SERPL-MCNC: 7.1 G/DL (ref 6.1–8.1)
RBC # BLD AUTO: 5.02 MILLION/UL (ref 3.8–5.1)
SODIUM SERPL-SCNC: 140 MMOL/L (ref 135–146)
WBC # BLD AUTO: 5.9 THOUSAND/UL (ref 3.8–10.8)

## 2025-04-14 DIAGNOSIS — I15.9 SECONDARY HYPERTENSION: ICD-10-CM

## 2025-04-14 RX ORDER — METOPROLOL SUCCINATE 25 MG/1
25 TABLET, EXTENDED RELEASE ORAL DAILY
Qty: 90 TABLET | Refills: 3 | OUTPATIENT
Start: 2025-04-14

## 2025-04-14 RX ORDER — LISINOPRIL AND HYDROCHLOROTHIAZIDE 10; 12.5 MG/1; MG/1
1 TABLET ORAL DAILY
Qty: 90 TABLET | Refills: 3 | OUTPATIENT
Start: 2025-04-14

## 2025-04-14 RX ORDER — METOPROLOL SUCCINATE 25 MG/1
25 TABLET, EXTENDED RELEASE ORAL DAILY
Qty: 90 TABLET | Refills: 1 | Status: SHIPPED | OUTPATIENT
Start: 2025-04-14

## 2025-04-14 RX ORDER — LISINOPRIL AND HYDROCHLOROTHIAZIDE 10; 12.5 MG/1; MG/1
1 TABLET ORAL DAILY
Qty: 90 TABLET | Refills: 1 | Status: SHIPPED | OUTPATIENT
Start: 2025-04-14

## 2025-04-20 ENCOUNTER — HOSPITAL ENCOUNTER (EMERGENCY)
Facility: HOSPITAL | Age: 70
Discharge: HOME | End: 2025-04-20
Payer: MEDICARE

## 2025-04-20 VITALS
SYSTOLIC BLOOD PRESSURE: 112 MMHG | HEART RATE: 73 BPM | OXYGEN SATURATION: 93 % | DIASTOLIC BLOOD PRESSURE: 74 MMHG | BODY MASS INDEX: 26.58 KG/M2 | WEIGHT: 150 LBS | RESPIRATION RATE: 16 BRPM | TEMPERATURE: 96.2 F

## 2025-04-20 DIAGNOSIS — G43.809 OTHER MIGRAINE WITHOUT STATUS MIGRAINOSUS, NOT INTRACTABLE: Primary | ICD-10-CM

## 2025-04-20 PROCEDURE — 96374 THER/PROPH/DIAG INJ IV PUSH: CPT

## 2025-04-20 PROCEDURE — 96375 TX/PRO/DX INJ NEW DRUG ADDON: CPT

## 2025-04-20 PROCEDURE — 96361 HYDRATE IV INFUSION ADD-ON: CPT

## 2025-04-20 PROCEDURE — 2500000004 HC RX 250 GENERAL PHARMACY W/ HCPCS (ALT 636 FOR OP/ED): Performed by: PHYSICIAN ASSISTANT

## 2025-04-20 PROCEDURE — 99284 EMERGENCY DEPT VISIT MOD MDM: CPT

## 2025-04-20 RX ORDER — METOCLOPRAMIDE HYDROCHLORIDE 5 MG/ML
10 INJECTION INTRAMUSCULAR; INTRAVENOUS ONCE
Status: COMPLETED | OUTPATIENT
Start: 2025-04-20 | End: 2025-04-20

## 2025-04-20 RX ORDER — KETOROLAC TROMETHAMINE 30 MG/ML
15 INJECTION, SOLUTION INTRAMUSCULAR; INTRAVENOUS ONCE
Status: COMPLETED | OUTPATIENT
Start: 2025-04-20 | End: 2025-04-20

## 2025-04-20 RX ORDER — DIPHENHYDRAMINE HYDROCHLORIDE 50 MG/ML
25 INJECTION, SOLUTION INTRAMUSCULAR; INTRAVENOUS ONCE
Status: COMPLETED | OUTPATIENT
Start: 2025-04-20 | End: 2025-04-20

## 2025-04-20 RX ADMIN — SODIUM CHLORIDE 500 ML: 0.9 INJECTION, SOLUTION INTRAVENOUS at 17:47

## 2025-04-20 RX ADMIN — METOCLOPRAMIDE 10 MG: 5 INJECTION, SOLUTION INTRAMUSCULAR; INTRAVENOUS at 17:53

## 2025-04-20 RX ADMIN — DIPHENHYDRAMINE HYDROCHLORIDE 25 MG: 50 INJECTION INTRAMUSCULAR; INTRAVENOUS at 17:51

## 2025-04-20 RX ADMIN — KETOROLAC TROMETHAMINE 15 MG: 30 INJECTION, SOLUTION INTRAMUSCULAR at 17:49

## 2025-04-20 ASSESSMENT — PAIN SCALES - GENERAL
PAINLEVEL_OUTOF10: 10 - WORST POSSIBLE PAIN
PAINLEVEL_OUTOF10: 10 - WORST POSSIBLE PAIN
PAINLEVEL_OUTOF10: 0 - NO PAIN
PAINLEVEL_OUTOF10: 0 - NO PAIN
PAINLEVEL_OUTOF10: 10 - WORST POSSIBLE PAIN
PAINLEVEL_OUTOF10: 0 - NO PAIN
PAINLEVEL_OUTOF10: 10 - WORST POSSIBLE PAIN

## 2025-04-20 ASSESSMENT — PAIN DESCRIPTION - PAIN TYPE: TYPE: ACUTE PAIN

## 2025-04-20 ASSESSMENT — COLUMBIA-SUICIDE SEVERITY RATING SCALE - C-SSRS
1. IN THE PAST MONTH, HAVE YOU WISHED YOU WERE DEAD OR WISHED YOU COULD GO TO SLEEP AND NOT WAKE UP?: NO
6. HAVE YOU EVER DONE ANYTHING, STARTED TO DO ANYTHING, OR PREPARED TO DO ANYTHING TO END YOUR LIFE?: NO
2. HAVE YOU ACTUALLY HAD ANY THOUGHTS OF KILLING YOURSELF?: NO

## 2025-04-20 ASSESSMENT — PAIN - FUNCTIONAL ASSESSMENT: PAIN_FUNCTIONAL_ASSESSMENT: 0-10

## 2025-04-20 ASSESSMENT — PAIN DESCRIPTION - LOCATION
LOCATION: HEAD
LOCATION: HEAD

## 2025-04-20 NOTE — ED PROVIDER NOTES
HPI   Chief Complaint   Patient presents with    Migraine     Headache, nausea, eye pain and dizziness that started this am at 0300.       Patient presents with migraine headache.  States its classic for her typical migraine.  She states that she has had them for years.  She had had some relief for a while when she had neck surgery.  But now her headaches have recurred.  They are in the same pattern as her typical migraine.  Patient denies any visual disturbance.  She is nauseous without any vomiting.  Does have trouble with lights and loud noises.  She has not tried any self treatment.      History provided by:  Patient          Patient History   Medical History[1]  Surgical History[2]  Family History[3]  Social History[4]    Physical Exam   ED Triage Vitals   Temperature Heart Rate Respirations BP   04/20/25 1742 04/20/25 1742 04/20/25 1742 04/20/25 1742   35.7 °C (96.2 °F) 75 20 (!) 134/97      Pulse Ox Temp Source Heart Rate Source Patient Position   04/20/25 1742 04/20/25 1742 04/20/25 1800 04/20/25 1800   96 % Oral Monitor Lying      BP Location FiO2 (%)     04/20/25 1800 --     Left arm        Physical Exam  Vitals and nursing note reviewed.   Constitutional:       General: She is not in acute distress.     Appearance: Normal appearance. She is normal weight. She is not ill-appearing or toxic-appearing.   HENT:      Head: Normocephalic.      Right Ear: Ear canal and external ear normal.      Left Ear: Ear canal and external ear normal.      Nose: Nose normal.      Mouth/Throat:      Lips: Pink. No lesions.      Mouth: Mucous membranes are moist.      Pharynx: Oropharynx is clear. No oropharyngeal exudate.   Eyes:      General: No scleral icterus.     Conjunctiva/sclera: Conjunctivae normal.   Cardiovascular:      Rate and Rhythm: Normal rate and regular rhythm.      Heart sounds: Normal heart sounds.   Pulmonary:      Effort: Pulmonary effort is normal.      Breath sounds: Normal breath sounds and air entry.    Musculoskeletal:      Cervical back: No spinous process tenderness or muscular tenderness.   Skin:     General: Skin is warm.      Capillary Refill: Capillary refill takes less than 2 seconds.   Neurological:      General: No focal deficit present.      Mental Status: She is alert and oriented to person, place, and time.      GCS: GCS eye subscore is 4. GCS verbal subscore is 5. GCS motor subscore is 6.      Cranial Nerves: No cranial nerve deficit or facial asymmetry.      Sensory: No sensory deficit.      Motor: No weakness.      Gait: Gait is intact. Gait normal.   Psychiatric:         Attention and Perception: Attention and perception normal.         Mood and Affect: Mood and affect normal.         Speech: Speech normal.         Behavior: Behavior normal. Behavior is cooperative.         Thought Content: Thought content normal.         Cognition and Memory: Cognition and memory normal.         Judgment: Judgment normal.           ED Course & MDM   Diagnoses as of 04/20/25 1851   Other migraine without status migrainosus, not intractable                 No data recorded                                 Medical Decision Making  Patient presents with migraine headache.  States its classic for her typical migraine.  She states that she has had them for years.  She had had some relief for a while when she had neck surgery.  But now her headaches have recurred.  They are in the same pattern as her typical migraine.  Patient denies any visual disturbance.  She is nauseous without any vomiting.  Does have trouble with lights and loud noises.  She has not tried any self treatment.    Ddx: Migraine, tension headache, infection, metabolic, other    Patient declined any evaluation including but not limited to imaging as well as labs.  She voiced understanding all risk and benefit and is competent make decision.  She states it feels typical for her migraine and would simply like the cocktail that she received the last time  she was here.  Is it helped significantly with her headache.  Therefore patient given IV fluids with Reglan Benadryl and Toradol.  On reevaluation patient states she feels significantly improved and would like to be discharged home.  She is encouraged to return with any worsening symptoms or concerns otherwise follow-up with family care provider in the next few days.  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    Hypertension     Leg fracture     Obstructive sleep apnea (adult) (pediatric)     Obstructive sleep apnea, adult    Other specified abnormal findings of blood chemistry 08/11/2021    Elevated liver function tests    Personal history of other diseases of the musculoskeletal system and connective tissue     History of osteoporosis    Personal history of other endocrine, nutritional and metabolic disease     History of hypothyroidism    Personal history of other endocrine, nutritional and metabolic disease     History of hyperlipidemia    Personal history of other specified conditions 07/30/2020    History of abdominal pain    Unspecified fracture of sternum, initial encounter for closed fracture 01/28/2021    Fracture of sternum   [2]   Past Surgical History:  Procedure Laterality Date    CATARACT EXTRACTION Bilateral     3/2024    CERVICAL DISC SURGERY      plate in neck, reports full ROM    COLONOSCOPY  01/29/2020    Complete Colonoscopy    OTHER SURGICAL HISTORY  06/09/2021    thumb replacement    OTHER SURGICAL HISTORY  01/29/2020    Wrist surgery   [3] No family history on file.  [4]   Social History  Tobacco Use    Smoking status: Former     Current packs/day: 0.00     Types: Cigarettes      Quit date:      Years since quittin.3    Smokeless tobacco: Never   Vaping Use    Vaping status: Never Used   Substance Use Topics    Alcohol use: Yes     Comment: rarely    Drug use: Not Currently        Francisca Cadena PA-C  [phone]

## 2025-05-02 DIAGNOSIS — E03.9 ACQUIRED HYPOTHYROIDISM: ICD-10-CM

## 2025-05-02 DIAGNOSIS — E78.00 ELEVATED LDL CHOLESTEROL LEVEL: ICD-10-CM

## 2025-05-07 RX ORDER — ATORVASTATIN CALCIUM 40 MG/1
40 TABLET, FILM COATED ORAL DAILY
Qty: 100 TABLET | Refills: 1 | Status: SHIPPED | OUTPATIENT
Start: 2025-05-07

## 2025-05-07 RX ORDER — LEVOTHYROXINE SODIUM 88 UG/1
88 TABLET ORAL DAILY
Qty: 100 TABLET | Refills: 1 | Status: SHIPPED | OUTPATIENT
Start: 2025-05-07

## 2025-05-20 ENCOUNTER — HOSPITAL ENCOUNTER (OUTPATIENT)
Dept: HOSPITAL 100 - RAD | Age: 70
Discharge: HOME | End: 2025-05-20
Payer: MEDICARE

## 2025-05-20 VITALS
RESPIRATION RATE: 16 BRPM | SYSTOLIC BLOOD PRESSURE: 127 MMHG | DIASTOLIC BLOOD PRESSURE: 84 MMHG | HEART RATE: 81 BPM | OXYGEN SATURATION: 94 %

## 2025-05-20 VITALS
RESPIRATION RATE: 16 BRPM | SYSTOLIC BLOOD PRESSURE: 138 MMHG | DIASTOLIC BLOOD PRESSURE: 85 MMHG | TEMPERATURE: 96.98 F | OXYGEN SATURATION: 95 % | HEART RATE: 95 BPM

## 2025-05-20 VITALS
RESPIRATION RATE: 16 BRPM | OXYGEN SATURATION: 96 % | SYSTOLIC BLOOD PRESSURE: 130 MMHG | DIASTOLIC BLOOD PRESSURE: 82 MMHG | HEART RATE: 76 BPM

## 2025-05-20 DIAGNOSIS — M50.321: ICD-10-CM

## 2025-05-20 DIAGNOSIS — Z98.1: ICD-10-CM

## 2025-05-20 DIAGNOSIS — Z01.818: Primary | ICD-10-CM

## 2025-05-20 LAB
APTT PPP: 26.7 SECONDS (ref 24.1–36.2)
DEPRECATED RDW RBC: 44.1 FL (ref 35.1–43.9)
ERYTHROCYTE [DISTWIDTH] IN BLOOD: 13.6 % (ref 11.6–14.6)
HCT VFR BLD AUTO: 44.3 % (ref 37–47)
HGB BLD-MCNC: 15.2 G/DL (ref 12–15)
MCV RBC: 89.9 FL (ref 81–99)
MEAN CORP HGB CONC: 34.3 G/DL (ref 32–36)
MEAN PLATELET VOL.: 11.4 FL (ref 6.2–12)
NRBC FLAGGED BY ANALYZER: 0 % (ref 0–5)
PLATELET # BLD: 246 K/MM3 (ref 150–450)
PROTHROMBIN TIME (PROTIME)PT.: 13.4 SECONDS (ref 11.7–14.9)
RBC # BLD AUTO: 4.93 M/MM3 (ref 4.2–5.4)
WBC # BLD AUTO: 7.9 K/MM3 (ref 4.4–11)

## 2025-05-20 PROCEDURE — 85610 PROTHROMBIN TIME: CPT

## 2025-05-20 PROCEDURE — 72126 CT NECK SPINE W/DYE: CPT

## 2025-05-20 PROCEDURE — 62302 MYELOGRAPHY LUMBAR INJECTION: CPT

## 2025-05-20 PROCEDURE — 85730 THROMBOPLASTIN TIME PARTIAL: CPT

## 2025-05-20 PROCEDURE — 36415 COLL VENOUS BLD VENIPUNCTURE: CPT

## 2025-05-20 PROCEDURE — 85025 COMPLETE CBC W/AUTO DIFF WBC: CPT

## 2025-05-20 RX ADMIN — LIDOCAINE HYDROCHLORIDE 0 ML: 20 INJECTION, SOLUTION EPIDURAL; INFILTRATION; INTRACAUDAL at 13:54

## 2025-07-30 ENCOUNTER — APPOINTMENT (OUTPATIENT)
Age: 70
End: 2025-07-30
Payer: MEDICARE

## 2025-07-30 VITALS
WEIGHT: 154 LBS | BODY MASS INDEX: 27.29 KG/M2 | HEART RATE: 78 BPM | OXYGEN SATURATION: 97 % | HEIGHT: 63 IN | DIASTOLIC BLOOD PRESSURE: 76 MMHG | SYSTOLIC BLOOD PRESSURE: 136 MMHG

## 2025-07-30 DIAGNOSIS — G47.09 OTHER INSOMNIA: ICD-10-CM

## 2025-07-30 DIAGNOSIS — L65.9 HAIR LOSS: ICD-10-CM

## 2025-07-30 DIAGNOSIS — F41.8 ANXIETY WITH DEPRESSION: ICD-10-CM

## 2025-07-30 DIAGNOSIS — R53.83 OTHER FATIGUE: ICD-10-CM

## 2025-07-30 DIAGNOSIS — Z01.818 PREOPERATIVE CLEARANCE: Primary | ICD-10-CM

## 2025-07-30 PROCEDURE — 99214 OFFICE O/P EST MOD 30 MIN: CPT | Performed by: INTERNAL MEDICINE

## 2025-07-30 PROCEDURE — 3078F DIAST BP <80 MM HG: CPT | Performed by: INTERNAL MEDICINE

## 2025-07-30 PROCEDURE — 3008F BODY MASS INDEX DOCD: CPT | Performed by: INTERNAL MEDICINE

## 2025-07-30 PROCEDURE — G2211 COMPLEX E/M VISIT ADD ON: HCPCS | Performed by: INTERNAL MEDICINE

## 2025-07-30 PROCEDURE — 3075F SYST BP GE 130 - 139MM HG: CPT | Performed by: INTERNAL MEDICINE

## 2025-07-30 PROCEDURE — 1159F MED LIST DOCD IN RCRD: CPT | Performed by: INTERNAL MEDICINE

## 2025-07-30 RX ORDER — RAMELTEON 8 MG/1
8 TABLET ORAL NIGHTLY
Qty: 30 TABLET | Refills: 5 | Status: SHIPPED | OUTPATIENT
Start: 2025-07-30 | End: 2026-07-30

## 2025-07-30 ASSESSMENT — ENCOUNTER SYMPTOMS
ABDOMINAL PAIN: 0
FATIGUE: 1
VOMITING: 0
BLOOD IN STOOL: 0
NAUSEA: 0
COUGH: 0
PALPITATIONS: 0
CHEST TIGHTNESS: 0
SHORTNESS OF BREATH: 0
WHEEZING: 0
BACK PAIN: 0
ARTHRALGIAS: 0
DIARRHEA: 0

## 2025-07-30 ASSESSMENT — PATIENT HEALTH QUESTIONNAIRE - PHQ9
1. LITTLE INTEREST OR PLEASURE IN DOING THINGS: NOT AT ALL
2. FEELING DOWN, DEPRESSED OR HOPELESS: NOT AT ALL
SUM OF ALL RESPONSES TO PHQ9 QUESTIONS 1 AND 2: 0

## 2025-07-30 NOTE — PROGRESS NOTES
Subjective   Patient ID: Caron Marc is a 69 y.o. female who presents for Follow-up (PRE OP CLEAR DR WELLS ).  HPI  She is here today for preoperative clearance in anticipation of an ACDF of C3-5 and removal of C5-7 hardware on August 27.  She has had no preoperative testing.  I explained to her that since it is under general anesthetic we will do a formal preoperative assessment and I will have her go for an EKG as well as lab work.  The good news is we have time to complete her clearance.  We also took this opportunity to discuss her overall health.  She states she is exhausted every day.  She suffers from chronic insomnia and has tried various things to help improve her sleep habits.  She tries to avoid naps and avoids caffeine after the noon hour.  She also gets up at the same time every morning.  She tried trazodone but she states it made her feel groggy and it was giving her nightmares.  The twist is that her  suffers from terrible night terrors.  She states that often times she wakes up to him thrashing around and sometimes even falls out of the bed and injures himself.  I did tell her that this could be part of her problem as she is anticipating the next event.  We had several suggestions.  She does use white noise as a background.  We went over the sleep hygiene rules.  We talked about having a device at the bedside with a boring video at low screen light that she can listen to when she cannot sleep.  The idea is to help her not ruminate about the earlier problems of the day.  I have also suggested she might want to get a separate bedroom.  We discussed falling asleep with her spouse but then moving to another bedroom if there is disruption.  I also encouraged her to have her  discuss his night terrors with his primary care physician.  We did conduct a full review of systems.  She climbs the steps in her home daily and does not experience any chest discomfort or breathing  difficulties.  She had an EKG back in February 2024 which was completely normal.  We will make comparison to her new acute EKG when she returns and complete her preoperative assessment at that time.  Review of Systems   Constitutional:  Positive for fatigue.   Respiratory:  Negative for cough, chest tightness, shortness of breath and wheezing.    Cardiovascular:  Negative for chest pain, palpitations and leg swelling.   Gastrointestinal:  Negative for abdominal pain, blood in stool, diarrhea, nausea and vomiting.   Musculoskeletal:  Negative for arthralgias and back pain.     Objective   Physical Exam  Vitals and nursing note reviewed.   Constitutional:       General: She is not in acute distress.     Appearance: Normal appearance.   HENT:      Head: Normocephalic and atraumatic.     Eyes:      Conjunctiva/sclera: Conjunctivae normal.       Cardiovascular:      Rate and Rhythm: Normal rate and regular rhythm.      Heart sounds: Normal heart sounds.   Pulmonary:      Effort: No respiratory distress.      Breath sounds: No wheezing.   Abdominal:      Palpations: Abdomen is soft.      Tenderness: There is no abdominal tenderness. There is no guarding.     Musculoskeletal:         General: No swelling. Normal range of motion.     Skin:     General: Skin is warm and dry.     Neurological:      General: No focal deficit present.      Mental Status: She is alert and oriented to person, place, and time.     Psychiatric:         Behavior: Behavior normal.       Assessment/Plan   Problem List Items Addressed This Visit           ICD-10-CM    Anxiety with depression F41.8    - She has been taking Effexor but was expressing interest in weaning the medicine.  I have suggested that we should wean the medicine after she has been through surgery and is back to recovery because sometimes pain from surgery can trigger anxiety or depression         Relevant Medications    ramelteon (Rozerem) 8 mg tablet    Insomnia G47.00    - We  discussed several obstacles to restful sleep including possibly the influence of her 's night terrors  -She will continue to follow the sleep hygiene principles that we reviewed today  -We will try Rozerem and she will try the technique we described by watching a dry boring video         Relevant Medications    ramelteon (Rozerem) 8 mg tablet    Preoperative clearance - Primary Z01.818    -She will go for preoperative EKG-last EKG was in February 2024 and normal   - She has no cardiopulmonary symptoms and does climb stairs in her home  -She will get comprehensive lab work  -Her surgery is scheduled for August 27 and therefore we will complete her preoperative assessment at her next visit         Relevant Orders    ECG 12 lead (Ancillary Performed)    CBC and Auto Differential    Comprehensive Metabolic Panel    Hair loss L65.9    - We will check a TSH         Relevant Orders    TSH    Other fatigue R53.83    Relevant Orders    CBC and Auto Differential    Comprehensive Metabolic Panel    TSH   Patient instructions  As we discussed I ordered lab work and an EKG which can be done at your earliest convenience.  When you come back we will go over the results.  Please remember our discussion about sleep and I did send a new prescription to your pharmacy for a sleep aid.  We will see you back soon to go over results and discuss your success with improving your sleep habits.       Eleanor Downs, DO

## 2025-07-30 NOTE — ASSESSMENT & PLAN NOTE
- She has been taking Effexor but was expressing interest in weaning the medicine.  I have suggested that we should wean the medicine after she has been through surgery and is back to recovery because sometimes pain from surgery can trigger anxiety or depression

## 2025-07-30 NOTE — PATIENT INSTRUCTIONS
Patient instructions  As we discussed I ordered lab work and an EKG which can be done at your earliest convenience.  When you come back we will go over the results.  Please remember our discussion about sleep and I did send a new prescription to your pharmacy for a sleep aid.  We will see you back soon to go over results and discuss your success with improving your sleep habits.

## 2025-07-30 NOTE — ASSESSMENT & PLAN NOTE
-She will go for preoperative EKG-last EKG was in February 2024 and normal   - She has no cardiopulmonary symptoms and does climb stairs in her home  -She will get comprehensive lab work  -Her surgery is scheduled for August 27 and therefore we will complete her preoperative assessment at her next visit

## 2025-07-30 NOTE — ASSESSMENT & PLAN NOTE
- We discussed several obstacles to restful sleep including possibly the influence of her 's night terrors  -She will continue to follow the sleep hygiene principles that we reviewed today  -We will try Rozerem and she will try the technique we described by watching a dry boring video

## 2025-08-06 ENCOUNTER — HOSPITAL ENCOUNTER (OUTPATIENT)
Dept: CARDIOLOGY | Facility: HOSPITAL | Age: 70
Discharge: HOME | End: 2025-08-06
Payer: MEDICARE

## 2025-08-06 DIAGNOSIS — Z01.818 PREOPERATIVE CLEARANCE: ICD-10-CM

## 2025-08-06 PROCEDURE — 93005 ELECTROCARDIOGRAM TRACING: CPT

## 2025-08-06 PROCEDURE — 93010 ELECTROCARDIOGRAM REPORT: CPT | Performed by: STUDENT IN AN ORGANIZED HEALTH CARE EDUCATION/TRAINING PROGRAM

## 2025-08-07 LAB
ALBUMIN SERPL-MCNC: 4.7 G/DL (ref 3.6–5.1)
ALP SERPL-CCNC: 49 U/L (ref 37–153)
ALT SERPL-CCNC: 26 U/L (ref 6–29)
ANION GAP SERPL CALCULATED.4IONS-SCNC: 10 MMOL/L (CALC) (ref 7–17)
AST SERPL-CCNC: 26 U/L (ref 10–35)
BASOPHILS # BLD AUTO: 153 CELLS/UL (ref 0–200)
BASOPHILS NFR BLD AUTO: 2.5 %
BILIRUB SERPL-MCNC: 0.6 MG/DL (ref 0.2–1.2)
BUN SERPL-MCNC: 24 MG/DL (ref 7–25)
CALCIUM SERPL-MCNC: 9.7 MG/DL (ref 8.6–10.4)
CHLORIDE SERPL-SCNC: 102 MMOL/L (ref 98–110)
CO2 SERPL-SCNC: 27 MMOL/L (ref 20–32)
CREAT SERPL-MCNC: 0.82 MG/DL (ref 0.6–1)
EGFRCR SERPLBLD CKD-EPI 2021: 77 ML/MIN/1.73M2
EOSINOPHIL # BLD AUTO: 232 CELLS/UL (ref 15–500)
EOSINOPHIL NFR BLD AUTO: 3.8 %
ERYTHROCYTE [DISTWIDTH] IN BLOOD BY AUTOMATED COUNT: 13.1 % (ref 11–15)
GLUCOSE SERPL-MCNC: 109 MG/DL (ref 65–99)
HCT VFR BLD AUTO: 44.2 % (ref 35–45)
HGB BLD-MCNC: 15.1 G/DL (ref 11.7–15.5)
LYMPHOCYTES # BLD AUTO: 2001 CELLS/UL (ref 850–3900)
LYMPHOCYTES NFR BLD AUTO: 32.8 %
MCH RBC QN AUTO: 32.2 PG (ref 27–33)
MCHC RBC AUTO-ENTMCNC: 34.2 G/DL (ref 32–36)
MCV RBC AUTO: 94.2 FL (ref 80–100)
MONOCYTES # BLD AUTO: 592 CELLS/UL (ref 200–950)
MONOCYTES NFR BLD AUTO: 9.7 %
NEUTROPHILS # BLD AUTO: 3123 CELLS/UL (ref 1500–7800)
NEUTROPHILS NFR BLD AUTO: 51.2 %
PLATELET # BLD AUTO: 219 THOUSAND/UL (ref 140–400)
PMV BLD REES-ECKER: 11.9 FL (ref 7.5–12.5)
POTASSIUM SERPL-SCNC: 3.8 MMOL/L (ref 3.5–5.3)
PROT SERPL-MCNC: 7.1 G/DL (ref 6.1–8.1)
RBC # BLD AUTO: 4.69 MILLION/UL (ref 3.8–5.1)
SODIUM SERPL-SCNC: 139 MMOL/L (ref 135–146)
TSH SERPL-ACNC: 2.05 MIU/L (ref 0.4–4.5)
WBC # BLD AUTO: 6.1 THOUSAND/UL (ref 3.8–10.8)

## 2025-08-08 LAB
ATRIAL RATE: 78 BPM
P AXIS: 51 DEGREES
P OFFSET: 185 MS
P ONSET: 139 MS
PR INTERVAL: 172 MS
Q ONSET: 225 MS
QRS COUNT: 13 BEATS
QRS DURATION: 82 MS
QT INTERVAL: 404 MS
QTC CALCULATION(BAZETT): 460 MS
QTC FREDERICIA: 441 MS
R AXIS: 10 DEGREES
T AXIS: 40 DEGREES
T OFFSET: 427 MS
VENTRICULAR RATE: 78 BPM

## 2025-08-13 ENCOUNTER — APPOINTMENT (OUTPATIENT)
Age: 70
End: 2025-08-13
Payer: MEDICARE

## 2025-08-15 ENCOUNTER — OFFICE VISIT (OUTPATIENT)
Age: 70
End: 2025-08-15
Payer: MEDICARE

## 2025-08-15 VITALS
BODY MASS INDEX: 27.25 KG/M2 | HEART RATE: 87 BPM | SYSTOLIC BLOOD PRESSURE: 130 MMHG | OXYGEN SATURATION: 98 % | HEIGHT: 63 IN | WEIGHT: 153.8 LBS | DIASTOLIC BLOOD PRESSURE: 80 MMHG

## 2025-08-15 DIAGNOSIS — J34.9 SINUS DISORDER: Primary | ICD-10-CM

## 2025-08-15 DIAGNOSIS — Z01.818 PREOPERATIVE CLEARANCE: ICD-10-CM

## 2025-08-15 PROCEDURE — G2211 COMPLEX E/M VISIT ADD ON: HCPCS | Performed by: INTERNAL MEDICINE

## 2025-08-15 PROCEDURE — 1159F MED LIST DOCD IN RCRD: CPT | Performed by: INTERNAL MEDICINE

## 2025-08-15 PROCEDURE — 3079F DIAST BP 80-89 MM HG: CPT | Performed by: INTERNAL MEDICINE

## 2025-08-15 PROCEDURE — 3075F SYST BP GE 130 - 139MM HG: CPT | Performed by: INTERNAL MEDICINE

## 2025-08-15 PROCEDURE — 3008F BODY MASS INDEX DOCD: CPT | Performed by: INTERNAL MEDICINE

## 2025-08-15 PROCEDURE — 99214 OFFICE O/P EST MOD 30 MIN: CPT | Performed by: INTERNAL MEDICINE

## 2025-08-15 RX ORDER — GABAPENTIN 300 MG/1
300 CAPSULE ORAL 3 TIMES DAILY
COMMUNITY

## 2025-08-15 ASSESSMENT — ENCOUNTER SYMPTOMS
PALPITATIONS: 0
SINUS PAIN: 1
ABDOMINAL PAIN: 0
BLOOD IN STOOL: 0
FATIGUE: 0
COUGH: 0
VOMITING: 0
DIARRHEA: 0
SHORTNESS OF BREATH: 0
SORE THROAT: 0
NAUSEA: 0
FEVER: 0
SINUS PRESSURE: 1
WHEEZING: 0

## 2025-09-01 DIAGNOSIS — I15.9 SECONDARY HYPERTENSION: ICD-10-CM

## 2025-09-02 RX ORDER — METOPROLOL SUCCINATE 25 MG/1
25 TABLET, EXTENDED RELEASE ORAL DAILY
Qty: 100 TABLET | Refills: 0 | Status: SHIPPED | OUTPATIENT
Start: 2025-09-02

## 2025-09-02 RX ORDER — LISINOPRIL AND HYDROCHLOROTHIAZIDE 10; 12.5 MG/1; MG/1
1 TABLET ORAL DAILY
Qty: 100 TABLET | Refills: 0 | Status: SHIPPED | OUTPATIENT
Start: 2025-09-02

## 2025-10-06 ENCOUNTER — APPOINTMENT (OUTPATIENT)
Age: 70
End: 2025-10-06
Payer: MEDICARE

## (undated) DEVICE — Device